# Patient Record
Sex: FEMALE | Race: WHITE | HISPANIC OR LATINO | Employment: FULL TIME | URBAN - METROPOLITAN AREA
[De-identification: names, ages, dates, MRNs, and addresses within clinical notes are randomized per-mention and may not be internally consistent; named-entity substitution may affect disease eponyms.]

---

## 2018-05-10 ENCOUNTER — TRANSCRIBE ORDERS (OUTPATIENT)
Dept: LAB | Facility: HOSPITAL | Age: 28
End: 2018-05-10

## 2018-05-10 ENCOUNTER — APPOINTMENT (OUTPATIENT)
Dept: LAB | Facility: HOSPITAL | Age: 28
End: 2018-05-10
Attending: INTERNAL MEDICINE
Payer: COMMERCIAL

## 2018-05-10 DIAGNOSIS — Z00.00 ROUTINE MEDICAL EXAM: ICD-10-CM

## 2018-05-10 DIAGNOSIS — E55.9 AVITAMINOSIS D: Primary | ICD-10-CM

## 2018-05-10 DIAGNOSIS — E55.9 AVITAMINOSIS D: ICD-10-CM

## 2018-05-10 LAB
25(OH)D3 SERPL-MCNC: 17.6 NG/ML (ref 30–100)
ALBUMIN SERPL BCP-MCNC: 3.4 G/DL (ref 3.5–5)
ALP SERPL-CCNC: 82 U/L (ref 46–116)
ALT SERPL W P-5'-P-CCNC: 29 U/L (ref 12–78)
ANION GAP SERPL CALCULATED.3IONS-SCNC: 5 MMOL/L (ref 4–13)
AST SERPL W P-5'-P-CCNC: 16 U/L (ref 5–45)
BASOPHILS # BLD AUTO: 0.03 THOUSANDS/ΜL (ref 0–0.1)
BASOPHILS NFR BLD AUTO: 0 % (ref 0–1)
BILIRUB SERPL-MCNC: 0.52 MG/DL (ref 0.2–1)
BUN SERPL-MCNC: 12 MG/DL (ref 5–25)
CALCIUM SERPL-MCNC: 8.9 MG/DL (ref 8.3–10.1)
CHLORIDE SERPL-SCNC: 105 MMOL/L (ref 100–108)
CHOLEST SERPL-MCNC: 144 MG/DL (ref 50–200)
CO2 SERPL-SCNC: 29 MMOL/L (ref 21–32)
CREAT SERPL-MCNC: 0.81 MG/DL (ref 0.6–1.3)
EOSINOPHIL # BLD AUTO: 0.65 THOUSAND/ΜL (ref 0–0.61)
EOSINOPHIL NFR BLD AUTO: 8 % (ref 0–6)
ERYTHROCYTE [DISTWIDTH] IN BLOOD BY AUTOMATED COUNT: 12.4 % (ref 11.6–15.1)
GFR SERPL CREATININE-BSD FRML MDRD: 101 ML/MIN/1.73SQ M
GLUCOSE P FAST SERPL-MCNC: 95 MG/DL (ref 65–99)
HCT VFR BLD AUTO: 40.3 % (ref 34.8–46.1)
HDLC SERPL-MCNC: 53 MG/DL (ref 40–60)
HGB BLD-MCNC: 13.3 G/DL (ref 11.5–15.4)
LDLC SERPL CALC-MCNC: 78 MG/DL (ref 0–100)
LYMPHOCYTES # BLD AUTO: 3.33 THOUSANDS/ΜL (ref 0.6–4.47)
LYMPHOCYTES NFR BLD AUTO: 43 % (ref 14–44)
MCH RBC QN AUTO: 30.1 PG (ref 26.8–34.3)
MCHC RBC AUTO-ENTMCNC: 33 G/DL (ref 31.4–37.4)
MCV RBC AUTO: 91 FL (ref 82–98)
MONOCYTES # BLD AUTO: 0.35 THOUSAND/ΜL (ref 0.17–1.22)
MONOCYTES NFR BLD AUTO: 5 % (ref 4–12)
NEUTROPHILS # BLD AUTO: 3.43 THOUSANDS/ΜL (ref 1.85–7.62)
NEUTS SEG NFR BLD AUTO: 44 % (ref 43–75)
NRBC BLD AUTO-RTO: 0 /100 WBCS
PLATELET # BLD AUTO: 252 THOUSANDS/UL (ref 149–390)
PMV BLD AUTO: 11.4 FL (ref 8.9–12.7)
POTASSIUM SERPL-SCNC: 3.4 MMOL/L (ref 3.5–5.3)
PROT SERPL-MCNC: 7 G/DL (ref 6.4–8.2)
RBC # BLD AUTO: 4.42 MILLION/UL (ref 3.81–5.12)
SODIUM SERPL-SCNC: 139 MMOL/L (ref 136–145)
T4 FREE SERPL-MCNC: 0.85 NG/DL (ref 0.76–1.46)
TRIGL SERPL-MCNC: 67 MG/DL
TSH SERPL DL<=0.05 MIU/L-ACNC: 2.72 UIU/ML (ref 0.36–3.74)
WBC # BLD AUTO: 7.82 THOUSAND/UL (ref 4.31–10.16)

## 2018-05-10 PROCEDURE — 80053 COMPREHEN METABOLIC PANEL: CPT

## 2018-05-10 PROCEDURE — 80061 LIPID PANEL: CPT

## 2018-05-10 PROCEDURE — 82306 VITAMIN D 25 HYDROXY: CPT

## 2018-05-10 PROCEDURE — 84443 ASSAY THYROID STIM HORMONE: CPT

## 2018-05-10 PROCEDURE — 36415 COLL VENOUS BLD VENIPUNCTURE: CPT

## 2018-05-10 PROCEDURE — 85025 COMPLETE CBC W/AUTO DIFF WBC: CPT

## 2018-05-10 PROCEDURE — 84439 ASSAY OF FREE THYROXINE: CPT

## 2019-05-30 ENCOUNTER — HOSPITAL ENCOUNTER (OUTPATIENT)
Dept: RADIOLOGY | Facility: HOSPITAL | Age: 29
Discharge: HOME | End: 2019-05-30
Attending: INTERNAL MEDICINE
Payer: COMMERCIAL

## 2019-05-30 ENCOUNTER — TRANSCRIBE ORDERS (OUTPATIENT)
Dept: RADIOLOGY | Facility: HOSPITAL | Age: 29
End: 2019-05-30

## 2019-05-30 DIAGNOSIS — M25.562 LEFT KNEE PAIN, UNSPECIFIED CHRONICITY: Primary | ICD-10-CM

## 2019-05-30 DIAGNOSIS — M25.562 LEFT KNEE PAIN, UNSPECIFIED CHRONICITY: ICD-10-CM

## 2019-05-30 PROCEDURE — 73564 X-RAY EXAM KNEE 4 OR MORE: CPT

## 2022-03-17 ENCOUNTER — OFFICE VISIT (OUTPATIENT)
Dept: OBGYN CLINIC | Facility: CLINIC | Age: 32
End: 2022-03-17

## 2022-03-17 VITALS — WEIGHT: 248 LBS | BODY MASS INDEX: 38.92 KG/M2 | HEIGHT: 67 IN

## 2022-03-17 DIAGNOSIS — B37.3 VAGINAL YEAST INFECTION: Primary | ICD-10-CM

## 2022-03-17 DIAGNOSIS — N76.0 BACTERIAL VAGINOSIS: ICD-10-CM

## 2022-03-17 DIAGNOSIS — B96.89 BACTERIAL VAGINOSIS: ICD-10-CM

## 2022-03-17 PROCEDURE — 99203 OFFICE O/P NEW LOW 30 MIN: CPT | Performed by: OBSTETRICS & GYNECOLOGY

## 2022-03-17 RX ORDER — PANTOPRAZOLE SODIUM 40 MG/1
40 TABLET, DELAYED RELEASE ORAL DAILY
COMMUNITY
Start: 2022-02-28 | End: 2022-08-27

## 2022-03-17 RX ORDER — FLUCONAZOLE 150 MG/1
150 TABLET ORAL ONCE
Qty: 1 TABLET | Refills: 0 | Status: SHIPPED | OUTPATIENT
Start: 2022-03-17 | End: 2022-03-17

## 2022-03-17 RX ORDER — APIXABAN 2.5 MG/1
2.5 TABLET, FILM COATED ORAL 2 TIMES DAILY
COMMUNITY
Start: 2022-02-28

## 2022-03-17 RX ORDER — METRONIDAZOLE 500 MG/1
500 TABLET ORAL EVERY 12 HOURS SCHEDULED
Qty: 14 TABLET | Refills: 0 | Status: SHIPPED | OUTPATIENT
Start: 2022-03-17 | End: 2022-03-24

## 2022-03-17 RX ORDER — PANTOPRAZOLE SODIUM 40 MG/1
40 TABLET, DELAYED RELEASE ORAL DAILY
COMMUNITY
Start: 2022-02-28

## 2022-03-17 RX ORDER — CETIRIZINE HYDROCHLORIDE 10 MG/1
10 TABLET ORAL
COMMUNITY

## 2022-03-17 RX ORDER — B-COMPLEX WITH VITAMIN C
1 TABLET ORAL 3 TIMES DAILY
COMMUNITY

## 2023-03-25 NOTE — PATIENT INSTRUCTIONS
Thank you for choosing us for your  care today  If you have any questions about your ultrasound or care, please do not hesitate to contact us or your primary obstetrician  Some general instructions for your pregnancy are:    Exercise: Aim for 22 minutes per day (150 minutes per week) of regular exercise  Walking is great! Nutrition: Choose healthy sources of calcium, iron, and protein  Learn about Preeclampsia: preeclampsia is a common, serious high blood pressure complication in pregnancy  A blood pressure of 328HGDS (systolic or top number) or 22NZDN (diastolic or bottom number) is not normal and needs evaluation by your doctor  Aspirin is sometimes prescribed in early pregnancy to prevent preeclampsia in women with risk factors - ask your obstetrician if you should be on this medication  For more resources, visit:  MapCoverage fi  If you smoke, try to reduce how many cigarettes you smoke or try to quit completely  Do not vape  Other warning signs to watch out for in pregnancy or postpartum: chest pain, obstructed breathing or shortness of breath, seizures, thoughts of hurting yourself or your baby, bleeding, a painful or swollen leg, fever, or headache (see AWHON[facility] POST-BIRTH Warning Signs campaign)  If these happen call 911  Itching is also not normal in pregnancy and if you experience this, especially over your hands and feet, potentially worse at night, notify your doctors

## 2023-03-30 ENCOUNTER — ROUTINE PRENATAL (OUTPATIENT)
Dept: PERINATAL CARE | Facility: CLINIC | Age: 33
End: 2023-03-30

## 2023-03-30 VITALS
HEIGHT: 67 IN | BODY MASS INDEX: 29.35 KG/M2 | HEART RATE: 58 BPM | DIASTOLIC BLOOD PRESSURE: 56 MMHG | WEIGHT: 187 LBS | SYSTOLIC BLOOD PRESSURE: 99 MMHG

## 2023-03-30 DIAGNOSIS — Z82.49 FAMILY HISTORY OF THROMBOEMBOLIC DISEASE: ICD-10-CM

## 2023-03-30 DIAGNOSIS — O09.899 SUPERVISION OF OTHER HIGH RISK PREGNANCIES, UNSPECIFIED TRIMESTER: ICD-10-CM

## 2023-03-30 DIAGNOSIS — O99.840 PREVIOUS BARIATRIC SURGERY AFFECTING PREGNANCY, ANTEPARTUM: ICD-10-CM

## 2023-03-30 DIAGNOSIS — Z36.3 ENCOUNTER FOR ANTENATAL SCREENING FOR MALFORMATIONS: Primary | ICD-10-CM

## 2023-03-30 DIAGNOSIS — Z36.86 ENCOUNTER FOR ANTENATAL SCREENING FOR CERVICAL LENGTH: ICD-10-CM

## 2023-03-30 NOTE — PROGRESS NOTES
Ultrasound Probe Disinfection    A transvaginal ultrasound was performed  Prior to use, disinfection was performed with High Level Disinfection Process (Trophon)  Probe serial number [id number]: [id number] was used        Tita Dahl  03/30/23  8:34 AM

## 2023-03-30 NOTE — PROGRESS NOTES
"[address]: Ms Federico Riley was seen today for anatomic survey and cervical length screening ultrasound  See ultrasound report under \"OB Procedures\" tab  Review of Systems   Constitutional: Negative for chills, fever and unexpected weight change  HENT: Positive for congestion  Negative for dental problem, facial swelling and sore throat  Eyes: Negative for visual disturbance  Respiratory: Negative for cough and shortness of breath  Cardiovascular: Negative for chest pain and palpitations  Gastrointestinal: Negative for diarrhea and vomiting  Endocrine: Negative for polydipsia  Thirst   Genitourinary: Negative for dysuria and vaginal bleeding  Musculoskeletal: Negative for back pain and joint swelling  Skin: Negative for rash and wound  Allergic/Immunologic: Negative for immunocompromised state  Neurological: Negative for seizures and headaches  Hematological: Does not bruise/bleed easily  Psychiatric/Behavioral: Negative for hallucinations and suicidal ideas  Physical Exam  Constitutional:       General: She is not in acute distress  Appearance: Normal appearance  She is not ill-appearing, toxic-appearing or diaphoretic  HENT:      Head: Normocephalic and atraumatic  Nose: No congestion or rhinorrhea  Eyes:      General: No scleral icterus  Right eye: No discharge  Left eye: No discharge  Extraocular Movements: Extraocular movements intact  Conjunctiva/sclera: Conjunctivae normal    Pulmonary:      Effort: Pulmonary effort is normal  No respiratory distress  Musculoskeletal:      Cervical back: Normal range of motion  Skin:     Coloration: Skin is not jaundiced or pale  Findings: No erythema, lesion or rash  Neurological:      General: No focal deficit present  Mental Status: She is alert and oriented to person, place, and time     Psychiatric:         Mood and Affect: Mood normal          " Behavior: Behavior normal          Please don't hesitate to contact our office with any concerns or questions    Rosa Polanco MD

## 2023-03-30 NOTE — LETTER
3/30/2023    Julisa Rain DO  [address]    Patient: Saranya Romero   YOB: 1990   Date of Visit: 3/30/2023   Keyla Drain of this communication: Routine       Dear [facility],    This patient was seen recently in our  office  Consultation is contained in body of ultrasound report which has been faxed to you under separate cover; please contact us if you do not receive this  Please don't hesitate to contact our office with any concerns or questions       Sincerely,      Marilynn Brothers MD  Attending Physician, Nikolay

## 2023-06-15 ENCOUNTER — ULTRASOUND (OUTPATIENT)
Dept: PERINATAL CARE | Facility: CLINIC | Age: 33
End: 2023-06-15
Payer: COMMERCIAL

## 2023-06-15 VITALS
DIASTOLIC BLOOD PRESSURE: 64 MMHG | BODY MASS INDEX: 32.15 KG/M2 | HEIGHT: 67 IN | WEIGHT: 204.8 LBS | SYSTOLIC BLOOD PRESSURE: 122 MMHG

## 2023-06-15 DIAGNOSIS — Z82.49 FAMILY HISTORY OF THROMBOEMBOLIC DISEASE: ICD-10-CM

## 2023-06-15 DIAGNOSIS — Z3A.32 32 WEEKS GESTATION OF PREGNANCY: Primary | ICD-10-CM

## 2023-06-15 PROCEDURE — 76816 OB US FOLLOW-UP PER FETUS: CPT | Performed by: OBSTETRICS & GYNECOLOGY

## 2023-06-15 NOTE — PROGRESS NOTES
A fetal ultrasound was completed  See Ob procedures in Epic for an interpretation and recommendations  Do not hesitate to contact us in [facility] if you have questions  Amaury Bills MD, [facility]  Maternal Fetal Medicine 4

## 2023-06-15 NOTE — LETTER
Devika 15, 2023     Gladys Alvarado 60    Patient: Juni Tam   YOB: 1990   Date of Visit: 6/15/2023       Dear Dr Brice Banks: Thank you for referring Juni Tam to me for evaluation  Below are my notes for this consultation  If you have questions, please do not hesitate to call me  I look forward to following your patient along with you  Sincerely,        Izabel Willingham MD        CC: No Recipients    Izabel Willingham MD  6/15/2023 10:32 AM  Sign when Signing Visit  A fetal ultrasound was completed  See Ob procedures in Epic for an interpretation and recommendations  Do not hesitate to contact us in [facility] if you have questions  Cam Pugh MD, [facility]  Maternal Fetal Medicine

## 2023-07-14 ENCOUNTER — ULTRASOUND (OUTPATIENT)
Dept: PERINATAL CARE | Facility: CLINIC | Age: 33
End: 2023-07-14
Payer: COMMERCIAL

## 2023-07-14 VITALS
WEIGHT: 202.6 LBS | BODY MASS INDEX: 31.8 KG/M2 | DIASTOLIC BLOOD PRESSURE: 70 MMHG | HEIGHT: 67 IN | HEART RATE: 80 BPM | SYSTOLIC BLOOD PRESSURE: 110 MMHG

## 2023-07-14 DIAGNOSIS — Z36.89 ENCOUNTER FOR ULTRASOUND TO ASSESS FETAL GROWTH: ICD-10-CM

## 2023-07-14 DIAGNOSIS — O36.5990 FETAL GROWTH RESTRICTION ANTEPARTUM: ICD-10-CM

## 2023-07-14 DIAGNOSIS — Z3A.32 32 WEEKS GESTATION OF PREGNANCY: Primary | ICD-10-CM

## 2023-07-14 PROCEDURE — 59025 FETAL NON-STRESS TEST: CPT | Performed by: OBSTETRICS & GYNECOLOGY

## 2023-07-14 PROCEDURE — 76820 UMBILICAL ARTERY ECHO: CPT | Performed by: OBSTETRICS & GYNECOLOGY

## 2023-07-14 PROCEDURE — 76816 OB US FOLLOW-UP PER FETUS: CPT | Performed by: OBSTETRICS & GYNECOLOGY

## 2023-07-14 NOTE — LETTER
July 14, 2023     Bev Roth, [facility]    Patient: Boogie Carter   YOB: 1990   Date of Visit: 7/14/2023       Dear. Dr. Vilma Abernathy was seen today for an ultrasound, which will be separately forwarded via Fax. Please contact our office if you do not receive the report, or with any questions/concerns.     Sincerely,  Rica Hannon MD        CC: No Recipients

## 2023-07-14 NOTE — PROGRESS NOTES
Non-Stress Testing:    Non-Stress test, equipment, procedure, and expected outcome reviewed. Reviewed fetal kick counts and when to call OB. Verified patient understanding of fetal kick counts with teach back method. Patient reports feeling daily fetal movements. Patient has no questions or concerns.

## 2023-07-17 PROCEDURE — 87150 DNA/RNA AMPLIFIED PROBE: CPT | Performed by: OBSTETRICS & GYNECOLOGY

## 2023-07-18 ENCOUNTER — LAB REQUISITION (OUTPATIENT)
Dept: LAB | Facility: HOSPITAL | Age: 33
End: 2023-07-18
Payer: COMMERCIAL

## 2023-07-18 DIAGNOSIS — Z36.85 ENCOUNTER FOR ANTENATAL SCREENING FOR STREPTOCOCCUS B: ICD-10-CM

## 2023-07-19 ENCOUNTER — ULTRASOUND (OUTPATIENT)
Dept: PERINATAL CARE | Facility: CLINIC | Age: 33
End: 2023-07-19
Payer: COMMERCIAL

## 2023-07-19 VITALS
WEIGHT: 209 LBS | HEIGHT: 67 IN | HEART RATE: 95 BPM | DIASTOLIC BLOOD PRESSURE: 60 MMHG | BODY MASS INDEX: 32.8 KG/M2 | SYSTOLIC BLOOD PRESSURE: 120 MMHG

## 2023-07-19 DIAGNOSIS — Z3A.37 37 WEEKS GESTATION OF PREGNANCY: ICD-10-CM

## 2023-07-19 DIAGNOSIS — O36.5990 FETAL GROWTH RESTRICTION ANTEPARTUM: Primary | ICD-10-CM

## 2023-07-19 LAB — GP B STREP DNA SPEC QL NAA+PROBE: NEGATIVE

## 2023-07-19 PROCEDURE — 76820 UMBILICAL ARTERY ECHO: CPT | Performed by: STUDENT IN AN ORGANIZED HEALTH CARE EDUCATION/TRAINING PROGRAM

## 2023-07-19 PROCEDURE — 76815 OB US LIMITED FETUS(S): CPT | Performed by: STUDENT IN AN ORGANIZED HEALTH CARE EDUCATION/TRAINING PROGRAM

## 2023-07-19 PROCEDURE — 59025 FETAL NON-STRESS TEST: CPT | Performed by: STUDENT IN AN ORGANIZED HEALTH CARE EDUCATION/TRAINING PROGRAM

## 2023-07-19 NOTE — PROGRESS NOTES
[id number]  [facility] [place]: Ms. Janak Waters was seen today for umbilical artery Doppler study, KATIE, and NST. See ultrasound report under "OB Procedures" tab. Please don't hesitate to contact our office with any concerns or questions.   -Pamela Costello MD

## 2023-07-19 NOTE — LETTER
July 19, 2023     Marbella [employer]nerg, [address]    Patient: Parul Monroy   YOB: 1990   Date of Visit: 7/19/2023       Dear Dr. Esa Amezcua: Thank you for referring Parul Monroy to me for evaluation. Below are my notes for this consultation. If you have questions, please do not hesitate to call me. I look forward to following your patient along with you. Sincerely,        Floyd Ward MD        CC: No Recipients    Floyd Ward MD  7/19/2023 11:48 AM  Sign when Signing Visit  [id number]  [facility] [place]: Ms. Myah Callaway was seen today for umbilical artery Doppler study, KATIE, and NST. See ultrasound report under "OB Procedures" tab. Please don't hesitate to contact our office with any concerns or questions.   -Floyd Ward MD

## 2023-07-26 ENCOUNTER — HOSPITAL ENCOUNTER (INPATIENT)
Facility: HOSPITAL | Age: 33
LOS: 2 days | Discharge: HOME | End: 2023-07-29
Attending: OBSTETRICS & GYNECOLOGY | Admitting: OBSTETRICS & GYNECOLOGY
Payer: COMMERCIAL

## 2023-07-26 DIAGNOSIS — Z3A.38 38 WEEKS GESTATION OF PREGNANCY: Primary | ICD-10-CM

## 2023-07-27 ENCOUNTER — ANESTHESIA (INPATIENT)
Dept: ANESTHESIOLOGY | Facility: HOSPITAL | Age: 33
End: 2023-07-27
Payer: COMMERCIAL

## 2023-07-27 ENCOUNTER — ANESTHESIA EVENT (INPATIENT)
Dept: ANESTHESIOLOGY | Facility: HOSPITAL | Age: 33
End: 2023-07-27
Payer: COMMERCIAL

## 2023-07-27 PROBLEM — O99.843 BARIATRIC SURGERY STATUS COMPLICATING PREGNANCY, THIRD TRIMESTER: Status: ACTIVE | Noted: 2023-07-27

## 2023-07-27 PROBLEM — J45.909 ASTHMA: Status: ACTIVE | Noted: 2023-07-27

## 2023-07-27 PROBLEM — Z34.90 ENCOUNTER FOR INDUCTION OF LABOR: Status: ACTIVE | Noted: 2023-07-27

## 2023-07-27 PROBLEM — Z98.84 BARIATRIC SURGERY STATUS: Status: ACTIVE | Noted: 2023-07-27

## 2023-07-27 PROBLEM — Z98.84 BARIATRIC SURGERY STATUS: Status: RESOLVED | Noted: 2023-07-27 | Resolved: 2023-07-27

## 2023-07-27 PROBLEM — O42.90 LEAKAGE OF AMNIOTIC FLUID: Status: ACTIVE | Noted: 2023-07-27

## 2023-07-27 LAB
ABO GROUP BLD: NORMAL
ABO GROUP BLD: NORMAL
BASE EXCESS BLDCOA CALC-SCNC: -3.9 MMOL/L (ref 3–11)
BASE EXCESS BLDCOV CALC-SCNC: -0.9 MMOL/L (ref 1–9)
BLD GP AB SCN SERPL QL: NEGATIVE
ERYTHROCYTE [DISTWIDTH] IN BLOOD BY AUTOMATED COUNT: 12.9 % (ref 11.6–15.1)
HCO3 BLDCOA-SCNC: 22.4 MMOL/L (ref 17.3–27.3)
HCO3 BLDCOV-SCNC: 24.5 MMOL/L (ref 12.2–28.6)
HCT VFR BLD AUTO: 33.4 % (ref 34.8–46.1)
HGB BLD-MCNC: 10.7 G/DL (ref 11.5–15.4)
HOLD SPECIMEN: NORMAL
MCH RBC QN AUTO: 29.3 PG (ref 26.8–34.3)
MCHC RBC AUTO-ENTMCNC: 32 G/DL (ref 31.4–37.4)
MCV RBC AUTO: 92 FL (ref 82–98)
O2 CT VFR BLDCOA CALC: 7.2 ML/DL
OXYHGB MFR BLDCOA: 32 %
OXYHGB MFR BLDCOV: 55.9 %
PCO2 BLDCOA: 45.5 MM[HG] (ref 30–60)
PCO2 BLDCOV: 43.2 MM HG (ref 27–43)
PH BLDCOA: 7.31 [PH] (ref 7.23–7.43)
PH BLDCOV: 7.37 [PH] (ref 7.19–7.49)
PLATELET # BLD AUTO: 249 THOUSANDS/UL (ref 149–390)
PMV BLD AUTO: 11.8 FL (ref 8.9–12.7)
PO2 BLDCOA: 16 MM HG (ref 5–25)
PO2 BLDCOV: 22.5 MM HG (ref 15–45)
RBC # BLD AUTO: 3.65 MILLION/UL (ref 3.81–5.12)
RH BLD: POSITIVE
RH BLD: POSITIVE
SAO2 % BLDCOV: 12.7 ML/DL
SPECIMEN EXPIRATION DATE: NORMAL
TREPONEMA PALLIDUM IGG+IGM AB [PRESENCE] IN SERUM OR PLASMA BY IMMUNOASSAY: NORMAL
WBC # BLD AUTO: 8.63 THOUSAND/UL (ref 4.31–10.16)

## 2023-07-27 PROCEDURE — 86850 RBC ANTIBODY SCREEN: CPT

## 2023-07-27 PROCEDURE — 99202 OFFICE O/P NEW SF 15 MIN: CPT

## 2023-07-27 PROCEDURE — 86780 TREPONEMA PALLIDUM: CPT

## 2023-07-27 PROCEDURE — NC001 PR NO CHARGE: Performed by: OBSTETRICS & GYNECOLOGY

## 2023-07-27 PROCEDURE — 86900 BLOOD TYPING SEROLOGIC ABO: CPT

## 2023-07-27 PROCEDURE — 4A1HXCZ MONITORING OF PRODUCTS OF CONCEPTION, CARDIAC RATE, EXTERNAL APPROACH: ICD-10-PCS | Performed by: OBSTETRICS & GYNECOLOGY

## 2023-07-27 PROCEDURE — 85027 COMPLETE CBC AUTOMATED: CPT

## 2023-07-27 PROCEDURE — 86901 BLOOD TYPING SEROLOGIC RH(D): CPT

## 2023-07-27 PROCEDURE — 82805 BLOOD GASES W/O2 SATURATION: CPT | Performed by: OBSTETRICS & GYNECOLOGY

## 2023-07-27 PROCEDURE — 88307 TISSUE EXAM BY PATHOLOGIST: CPT | Performed by: PATHOLOGY

## 2023-07-27 PROCEDURE — 10H073Z INSERTION OF MONITORING ELECTRODE INTO PRODUCTS OF CONCEPTION, VIA NATURAL OR ARTIFICIAL OPENING: ICD-10-PCS | Performed by: OBSTETRICS & GYNECOLOGY

## 2023-07-27 PROCEDURE — 4A1H7CZ MONITORING OF PRODUCTS OF CONCEPTION, CARDIAC RATE, VIA NATURAL OR ARTIFICIAL OPENING: ICD-10-PCS | Performed by: OBSTETRICS & GYNECOLOGY

## 2023-07-27 RX ORDER — OXYTOCIN/RINGER'S LACTATE 30/500 ML
250 PLASTIC BAG, INJECTION (ML) INTRAVENOUS ONCE
Status: DISCONTINUED | OUTPATIENT
Start: 2023-07-27 | End: 2023-07-29 | Stop reason: HOSPADM

## 2023-07-27 RX ORDER — BUPIVACAINE HYDROCHLORIDE 2.5 MG/ML
30 INJECTION, SOLUTION EPIDURAL; INFILTRATION; INTRACAUDAL ONCE AS NEEDED
Status: DISCONTINUED | OUTPATIENT
Start: 2023-07-27 | End: 2023-07-27

## 2023-07-27 RX ORDER — DIPHENHYDRAMINE HCL 25 MG
25 TABLET ORAL EVERY 6 HOURS PRN
Status: DISCONTINUED | OUTPATIENT
Start: 2023-07-27 | End: 2023-07-29 | Stop reason: HOSPADM

## 2023-07-27 RX ORDER — ONDANSETRON 2 MG/ML
4 INJECTION INTRAMUSCULAR; INTRAVENOUS EVERY 4 HOURS PRN
Status: DISCONTINUED | OUTPATIENT
Start: 2023-07-27 | End: 2023-07-27

## 2023-07-27 RX ORDER — SODIUM CHLORIDE, SODIUM LACTATE, POTASSIUM CHLORIDE, CALCIUM CHLORIDE 600; 310; 30; 20 MG/100ML; MG/100ML; MG/100ML; MG/100ML
125 INJECTION, SOLUTION INTRAVENOUS CONTINUOUS
Status: DISCONTINUED | OUTPATIENT
Start: 2023-07-27 | End: 2023-07-27

## 2023-07-27 RX ORDER — IBUPROFEN 600 MG/1
600 TABLET ORAL EVERY 6 HOURS
Status: DISCONTINUED | OUTPATIENT
Start: 2023-07-27 | End: 2023-07-29 | Stop reason: HOSPADM

## 2023-07-27 RX ORDER — LIDOCAINE HYDROCHLORIDE AND EPINEPHRINE 15; 5 MG/ML; UG/ML
INJECTION, SOLUTION EPIDURAL AS NEEDED
Status: DISCONTINUED | OUTPATIENT
Start: 2023-07-27 | End: 2023-07-27 | Stop reason: HOSPADM

## 2023-07-27 RX ORDER — ROPIVACAINE HYDROCHLORIDE 2 MG/ML
INJECTION, SOLUTION EPIDURAL; INFILTRATION; PERINEURAL AS NEEDED
Status: DISCONTINUED | OUTPATIENT
Start: 2023-07-27 | End: 2023-07-27 | Stop reason: HOSPADM

## 2023-07-27 RX ORDER — DOCUSATE SODIUM 100 MG/1
100 CAPSULE, LIQUID FILLED ORAL 2 TIMES DAILY
Status: DISCONTINUED | OUTPATIENT
Start: 2023-07-27 | End: 2023-07-29 | Stop reason: HOSPADM

## 2023-07-27 RX ORDER — ONDANSETRON 2 MG/ML
4 INJECTION INTRAMUSCULAR; INTRAVENOUS EVERY 8 HOURS PRN
Status: DISCONTINUED | OUTPATIENT
Start: 2023-07-27 | End: 2023-07-29 | Stop reason: HOSPADM

## 2023-07-27 RX ORDER — DIAPER,BRIEF,INFANT-TODD,DISP
1 EACH MISCELLANEOUS DAILY PRN
Status: DISCONTINUED | OUTPATIENT
Start: 2023-07-27 | End: 2023-07-29 | Stop reason: HOSPADM

## 2023-07-27 RX ORDER — OXYTOCIN/RINGER'S LACTATE 30/500 ML
1-30 PLASTIC BAG, INJECTION (ML) INTRAVENOUS
Status: DISCONTINUED | OUTPATIENT
Start: 2023-07-27 | End: 2023-07-27

## 2023-07-27 RX ORDER — CALCIUM CARBONATE 500 MG/1
1000 TABLET, CHEWABLE ORAL DAILY PRN
Status: DISCONTINUED | OUTPATIENT
Start: 2023-07-27 | End: 2023-07-29 | Stop reason: HOSPADM

## 2023-07-27 RX ORDER — ACETAMINOPHEN 325 MG/1
650 TABLET ORAL EVERY 4 HOURS PRN
Status: DISCONTINUED | OUTPATIENT
Start: 2023-07-27 | End: 2023-07-29 | Stop reason: HOSPADM

## 2023-07-27 RX ADMIN — SODIUM CHLORIDE, SODIUM LACTATE, POTASSIUM CHLORIDE, AND CALCIUM CHLORIDE 125 ML/HR: .6; .31; .03; .02 INJECTION, SOLUTION INTRAVENOUS at 03:25

## 2023-07-27 RX ADMIN — Medication 250 MILLI-UNITS/MIN: at 06:42

## 2023-07-27 RX ADMIN — BENZOCAINE AND LEVOMENTHOL 1 APPLICATION: 200; 5 SPRAY TOPICAL at 09:35

## 2023-07-27 RX ADMIN — SODIUM CHLORIDE, SODIUM LACTATE, POTASSIUM CHLORIDE, AND CALCIUM CHLORIDE 999 ML/HR: .6; .31; .03; .02 INJECTION, SOLUTION INTRAVENOUS at 01:15

## 2023-07-27 RX ADMIN — WITCH HAZEL 1 PAD: 500 SOLUTION RECTAL; TOPICAL at 09:35

## 2023-07-27 RX ADMIN — ROPIVACAINE HYDROCHLORIDE 2 ML: 2 INJECTION EPIDURAL; INFILTRATION; PERINEURAL at 02:43

## 2023-07-27 RX ADMIN — SODIUM CHLORIDE, SODIUM LACTATE, POTASSIUM CHLORIDE, AND CALCIUM CHLORIDE 999 ML/HR: .6; .31; .03; .02 INJECTION, SOLUTION INTRAVENOUS at 02:15

## 2023-07-27 RX ADMIN — LIDOCAINE HYDROCHLORIDE AND EPINEPHRINE 3 ML: 15; 5 INJECTION, SOLUTION EPIDURAL at 02:35

## 2023-07-27 RX ADMIN — DOCUSATE SODIUM 100 MG: 100 CAPSULE, LIQUID FILLED ORAL at 18:30

## 2023-07-27 RX ADMIN — DOCUSATE SODIUM 100 MG: 100 CAPSULE, LIQUID FILLED ORAL at 09:36

## 2023-07-27 RX ADMIN — ROPIVACAINE HYDROCHLORIDE 10 ML/HR: 2 INJECTION EPIDURAL; INFILTRATION; PERINEURAL at 02:44

## 2023-07-27 RX ADMIN — ROPIVACAINE HYDROCHLORIDE 5 ML: 2 INJECTION EPIDURAL; INFILTRATION; PERINEURAL at 02:38

## 2023-07-27 NOTE — H&P
[address] 33 y.o. female MRN: [id number]  Unit/Bed#: L&D 324-01 Encounter: [id number]    Assessment: 33 y.o.  at 38w2d admitted for PROM vs SROM. SVE: /-2  FHT: Cat I  Clinical EFW: 6 ; Cephalic confirmed by ultrasound  GBS status: neg   Postpartum contraception: declines    Plan:   * Leakage of amniotic fluid  Assessment & Plan  Admit for SROM vs PROM  T&S, CBC, RPR  CLD  IV fluids  GBS prophylaxis is not needed  Expectant management, induce with pitocin if necessary      Bariatric surgery status complicating pregnancy, third trimester  Assessment & Plan  Patient reports in    Avoid NSAIDs    Fetal growth restriction antepartum  Assessment & Plan     AC             30.51 cm        34 weeks 4 days* (12%)  BPD             8.63 cm        34 weeks 6 days* (18%)  HC             32.36 cm        36 weeks 4 days* (25%)  Femur           6.22 cm        32 weeks 1 day * (<1%)     Humerus          5.3 cm        30 weeks 5 days  (<5%)     HC/AC           1.06 [0.93 - 1.11]                 (75%)  FL/AC             20 [20 - 24]  FL/BPD            72 [71 - 87]  EFW Hadlock 4   2348 grams - 5 lbs 3 oz                 (8%)     Nml dopplers        Discussed case and plan w/ Dr. Shanice Quesada      Chief Complaint: leaking fluid    HPI: Casey Palacios is a 33 y.o. Walte Marisol with an EMMANUELLE of 2023, by Last Menstrual Period at 38w2d who is being admitted for premature ruptue of membranes. She denies having uterine contractions, has moderate LOF, and reports no VB. She states she has felt good FM. Patient Active Problem List   Diagnosis   • Family history of thromboembolic disease   • 32 weeks gestation of pregnancy   • Fetal growth restriction antepartum   • Leakage of amniotic fluid   • Asthma   • Bariatric surgery status complicating pregnancy, third trimester       Baby complications/comments: IUGR    Review of Systems   Constitutional: Negative for chills and fever. Respiratory: Negative for cough and shortness of breath. Cardiovascular: Negative for chest pain and leg swelling. Gastrointestinal: Negative for abdominal pain, nausea and vomiting. Genitourinary: Negative for dysuria, pelvic pain, urgency, vaginal bleeding and vaginal discharge. Neurological: Negative for dizziness, light-headedness and headaches. All other systems reviewed and are negative. OB Hx:  OB History    Para Term  AB Living   2 1 1 0 0 1   SAB IAB Ectopic Multiple Live Births                  # Outcome Date GA Lbr Lior/2nd Weight Sex Delivery Anes PTL Lv   2 Current            1 Term                Past Medical Hx:  Past Medical History:   Diagnosis Date   • Asthma    • Narcolepsy    • PCOS (polycystic ovarian syndrome)    • Sleep apnea        Past Surgical hx:  Past Surgical History:   Procedure Laterality Date   • GASTRIC BYPASS      23       Social Hx:  Alcohol use: no  Tobacco use: no  Other substance use: no    No Known Allergies    Medications Prior to Admission   Medication   • cetirizine (ZyrTEC) 10 mg tablet   • Prenatal MV-Min-Fe Fum-FA-DHA (PRENATAL 1 PO)       Objective:  Temp:  [97.5 °F (36.4 °C)] 97.5 °F (36.4 °C)  HR:  [61-96] 73  Resp:  [18] 18  BP: (105-124)/(53-80) 105/58  Body mass index is 33.23 kg/m². Physical Exam:  Physical Exam  Constitutional:       Appearance: Normal appearance. Cardiovascular:      Rate and Rhythm: Normal rate and regular rhythm. Heart sounds: No murmur heard. No friction rub. No gallop. Pulmonary:      Effort: Pulmonary effort is normal. No respiratory distress. Breath sounds: No wheezing. Abdominal:      Palpations: Abdomen is soft. Tenderness: There is no abdominal tenderness. Musculoskeletal:         General: No swelling or tenderness. Neurological:      Mental Status: She is alert and oriented to person, place, and time. Skin:     General: Skin is warm and dry.    Psychiatric: Mood and Affect: Mood normal.   Vitals reviewed. FHT:  Baseline Rate: 120 bpm  Variability: Moderate 6-25 bpm  Accelerations: 15 x 15 or greater  Decelerations: None  FHR Category: Category I    TOCO:   Contraction Frequency (minutes): 4-5  Contraction Duration (seconds): 50-80  Contraction Quality: Moderate    Lab Results   Component Value Date    WBC 8.63 07/27/2023    HGB 10.7 (L) 07/27/2023    HCT 33.4 (L) 07/27/2023     07/27/2023     Lab Results   Component Value Date    K 3.4 (L) 05/10/2018     05/10/2018    CO2 29 05/10/2018    BUN 12 05/10/2018    CREATININE 0.81 05/10/2018    AST 16 05/10/2018    ALT 29 05/10/2018     Prenatal Labs: Reviewed      Blood type: O Pos  Antibody: Neg  GBS: Neg  HIV: NR  Rubella: Immune  Syphilis IgM/IgG: Neg  HBsAg: Neg  HCAb: Neg  Chlamydia: Neg  Gonorrhea: Neg  Diabetes 1 hour screen: normal  Platelets: pending  Hgb: pending  >2 Midnights  INPATIENT     Signature/Title:  Tressa Solorzano MD  Date: 7/27/2023  Time: 4:17 AM

## 2023-07-27 NOTE — LACTATION NOTE
This note was copied from a baby's chart. CONSULT - LACTATION  Baby Boy Tamiko Hinson 0 days male MRN: [id number]    [address] [place] [address] NURSERY Room / Bed: L&D 307(N)/L&D 307(N) Encounter: [id number]    Maternal Information     MOTHER:  Luisa William  Maternal Age: 33 y.o.   OB History: # 1 - Date: None, Sex: None, Weight: None, GA: None, Delivery: None, Apgar1: None, Apgar5: None, Living: None, Birth Comments: None    # 2 - Date: 23, Sex: Male, Weight: 2530 g (5 lb 9.2 oz), GA: 38w2d, Delivery: Vaginal, Spontaneous, Apgar1: 8, Apgar5: 9, Living: Living, Birth Comments: None   Previouse breast reduction surgery? No    Lactation history:   Has patient previously breast fed: No   How long had patient previously breast fed:     Previous breast feeding complications:       Past Surgical History:   Procedure Laterality Date   • GASTRIC BYPASS      22        Birth information:  YOB: 2022   Time of birth: 6:41 AM   Sex: male   Delivery type: Vaginal, Spontaneous   Birth Weight: 2530 g (5 lb 9.2 oz)   Percent of Weight Change: 0%     Gestational Age: 43w1d   [unfilled]    Assessment     Breast and nipple assessment: inverted nipple     Assessment: normal assessment    Feeding assessment: feeding well  LATCH:  Latch: Grasps breast, tongue down, lips flanged, rhythmic sucking   Audible Swallowing: Spontaneous and intermittent (24 hours old)   Type of Nipple: Inverted   Comfort (Breast/Nipple): Soft/non-tender   Hold (Positioning): Partial assist, teach one side, mother does other, staff holds   [facility] Score: 7           23 1600   Lactation Consultation   Reason for Consult 10;20 min   Lactation Consultant Total Time 27   Maternal Information   Has mother  before?  No   LATCH Documentation   Latch 2   Audible Swallowing 2   Type of Nipple 0   Comfort (Breast/Nipple) 2   Hold (Positioning) 1   LATCH Score 7   Having latch problems? No   Position(s) Used Cross Cradle   Breasts/Nipples   Left Breast Soft   Right Breast Soft   Left Nipple Everted   Right Nipple Inverted  (Nipple bifurcated with 2 lobes. When compressed across diameter, nipple inverts. With teacup hold, it remains everted enough to assist with [place] latching)   Intervention Hand expression  (effective)   Breastfeeding Progress Not yet established;Breastfeeding well   Breast Pump   Pump 3  (Has Lansinoh)   Patient Follow-Up   Lactation Consult Status 2   Follow-Up Type Inpatient;Call as needed   Other OB Lactation Documentation    Additional Problem Noted [place] is SGA baby on glucometer protocol. Latching well. 2 low glucometer readings. No supplementation recorded. HE effective  (Reviewed RSB. D/C booklet at bedside.)     Feeding recommendations:  breast feed on demand     Information on hand expression given. Discussed benefits of knowing how to manually express breast including stimulating milk supply, softening nipple for latch and evacuating breast in the event of engorgement. Reviewed how to bring baby to the breast so that his lower lip and chin touch the breast with his nose just above the nipple to encourage a wider, more asymmetric latch. Met with mother. Provided mother with Ready, Set, Baby booklet which contained information on:  Hand expression with access to QR codes to review hand expression. Positioning and latch reviewed as well as showing images of other feeding positions. Discussed the properties of a good latch in any position. Feeding on cue and what that means for recognizing infant's hunger, s/s that baby is getting enough milk and some s/s that breastfeeding dyad may need further help  Skin to Skin contact an benefits to mom and baby  Avoidance of pacifiers for the first month discussed. Gave information on common concerns, what to expect the first few weeks after delivery, preparing for other caregivers, and how partners can help. Resources for support also provided. Encouraged parents to call for assistance, questions, and concerns about breastfeeding. Extension provided.       Jeni Marques RN 7/27/2023 4:29 PM

## 2023-07-27 NOTE — OB LABOR/OXYTOCIN SAFETY PROGRESS
Labor Progress Note - Les Cordial 33 y.o. female MRN: [id number]    Unit/Bed#: L&D 324-01 Encounter: [id number]    Dose (markos-units/min) Oxytocin: 0 markos-units/min  Contraction Frequency (minutes): 1.5-5  Contraction Quality: Moderate      Cervical Dilation: 9        Cervical Effacement: 100  Fetal Station: 1  Baseline Rate: 120 bpm     FHR Category: Category I               Vital Signs:   Vitals:    07/27/23 0420   BP: 106/59   Pulse: 68   Resp:    Temp:    SpO2:        Notes/comments: Pt with intermittent variables, FSE placed for discontinuous tracing.          Gabe aMchado MD 7/27/2023 6:09 AM

## 2023-07-27 NOTE — ASSESSMENT & PLAN NOTE
AC             30.51 cm        34 weeks 4 days* (12%)  BPD             8.63 cm        34 weeks 6 days* (18%)  HC             32.36 cm        36 weeks 4 days* (25%)  Femur           6.22 cm        32 weeks 1 day * (<1%)     Humerus          5.3 cm        30 weeks 5 days  (<5%)     HC/AC           1.06 [0.93 - 1.11]                 (75%)  FL/AC             20 [20 - 24]  FL/BPD            72 [71 - 87]  EFW Hadlock 4   2348 grams - 5 lbs 3 oz                 (8%)     Nml dopplers

## 2023-07-27 NOTE — OB LABOR/OXYTOCIN SAFETY PROGRESS
Labor Progress Note - Reyes Villa 33 y.o. female MRN: [id number]    Unit/Bed#: L&D 324-01 Encounter: [id number]    Dose (markos-units/min) Oxytocin: 0 markos-units/min  Contraction Frequency (minutes): 1.5-5  Contraction Quality: Moderate      Cervical Dilation: 7        Cervical Effacement: 80  Fetal Station: 0  Baseline Rate: 120 bpm     FHR Category: Category I               Vital Signs:   Vitals:    07/27/23 0420   BP: 106/59   Pulse: 68   Resp:    Temp:    SpO2:        Notes/comments: Patient with intermittent variable decelerations, progressing appropriately.         Sruthi Reynolds MD 7/27/2023 5:45 AM

## 2023-07-27 NOTE — OB LABOR/OXYTOCIN SAFETY PROGRESS
Oxytocin Safety Progress Check Note - Boogie Carter 33 y.o. female MRN: [id number]    Unit/Bed#: L&D 324-01 Encounter: [id number]       Contraction Frequency (minutes): irregular  Contraction Quality: Mild      Cervical Dilation: 5        Cervical Effacement: 80  Fetal Station: -1  Baseline Rate: 120 bpm     FHR Category: Category I               Vital Signs:   Vitals:    07/27/23 0007   Temp: 97.5 °F (36.4 °C)       Notes/comments: Pt requesting epidural.         Zoë Howard MD 7/27/2023 1:57 AM

## 2023-07-27 NOTE — ANESTHESIA POSTPROCEDURE EVALUATION
Post-Op Assessment Note    CV Status:  Stable    Pain management: adequate     Mental Status:  Awake   Hydration Status:  Stable   PONV Controlled:  Controlled   Airway Patency:  Patent      Post Op Vitals Reviewed: Yes      Staff: Anesthesiologist     Post-op block assessment: site cleaned, no complications and catheter intact      No notable events documented.     BP      Temp     Pulse    Resp      SpO2      /64   Pulse (!) 53   Temp (!) 97 °F (36.1 °C)   Resp 18   Ht 5' 6.5" (1.689 m)   Wt 94.8 kg (209 lb)   LMP 11/01/2022   SpO2 99%   Breastfeeding Unknown   BMI 33.23 kg/m²

## 2023-07-27 NOTE — ASSESSMENT & PLAN NOTE
Continue routine post partum care  Encourage ambulation and breastfeeding/pumping  Declines post partum contraception, counseled on risks of close interval pregnancy

## 2023-07-27 NOTE — L&D DELIVERY NOTE
DELIVERY NOTE  Nora Redding 33 y.o. female MRN: [id number]  Unit/Bed#: L&D 324-01 Encounter: [id number]    Obstetrician:    Dr. Yury Brown DO    Assistant:   Dr. Ginger Hoover MD    Pre-Delivery Diagnosis:   Family history of thromboembolic disease   38 weeks gestation of pregnancy   Fetal growth restriction antepartum   Leakage of amniotic fluid   Asthma   Bariatric surgery status complicating pregnancy, third trimester       Post-Delivery Diagnosis:   Same as above - Delivered  Left labial laceration    Procedure:  Spontaneous vaginal delivery    Anesthesia:  epidural    Specimens:   Cord blood obtained   Placenta; normal appearing, eccentric insertion, intact   Arterial and venous blood gases (below)     Gases:  Umbilical Cord Venous Blood Gas:  Results from last 7 days   Lab Units 23  0641   PH COV  7.372   PCO2 COV mm HG 43.2*   HCO3 COV mmol/L 24.5   BASE EXC COV mmol/L -0.9*   O2 CT CD VB mL/dL 12.7   O2 HGB, VENOUS CORD % 47.3     Umbilical Cord Arterial Blood Gas:        Quantitative Blood Loss:   10 mL           Complications:    None    Brief Description of Labor Course:    Nora Redding is a 33 y.o.  at 38w2d wks who was initially admitted for rupture of membranes at /-2. She received epidural for anesthesia. She progressed on her own and began to have variable decelerations, and was found to be 10cm. She pushed and she delivered a viable male  on 23 at [phone]. Apgars were 8 (1 min) and 9 (5 min).  was transferred to  nursery. Patient tolerated the procedure well and was transferred to recovery in stable condition. Description of Delivery:   With  the assistance of maternal expulsive forces, the fetal vertex delivered spontaneously. The anterior right shoulder was delivered atraumatically with gentle downward traction. The contralateral arm was delivered with gentle upward traction and maternal expulsive forces.  The remainder of the fetus delivered spontaneously at [phone], resulting in a viable male . Upon delivery, the infant was placed on the mothers abdomen and the cord was doubly clamped and cut after 30 seconds. The  was noted to have good tone and cry spontaneously. There was no evidence of injury. The  was passed off to  staff for evaluation. Umbilical cord blood and umbilical artery and venous gases were collected and sent to the lab. An intact placenta was delivered spontaneously at 0644 using fundal massage and gentle cord traction and was noted to have a eccentrically-inserted 3-vessel cord. Active management of the third stage of labor was undertaken with IV pitocin at 250 milliunits/min. Inspection of the perineum, vagina, labia, cervix, and urethra revealed a left labial laceration. The laceration was hemostatic and did not require repair. Bleeding was noted to be under control. A bimanual exam was performed and uterine tone was firm.  Outcome:  Living  with APGARS 8 (1 min) and 9 (5 min).  weight: pending    At the conclusion of the delivery, all needle, sponge, and instrument counts were noted to be correct. Patient tolerated the procedure well and was allowed to recover in labor and delivery room with family and  before being transferred to the post-partum floor. Conclusion:  Mother and baby are currently recovering nicely in stable condition. Attending Supervision:   Dr. Fang Kyle DO was present for the entire procedure. Chrissie Randall MD   OB/GYN PGY-2  2023 7:16 AM    Attending Physician/Surgeon Statement  I was present for and participated in all key aspects of this patient's care. I agree with the resident's documentation as stated above.      Neida Davis  23  10:35 AM

## 2023-07-27 NOTE — ANESTHESIA PREPROCEDURE EVALUATION
Procedure:  LABOR ANALGESIA    Relevant Problems             PULMONARY   (+) Asthma     (+) nausea/vomiting     Physical Exam    Airway    Mallampati score: II  TM Distance: >3 FB  Neck ROM: full     Dental       Cardiovascular  Rhythm: regular, Rate: normal,     Pulmonary  Comment: Bilateral chest rise, not using accessory muscles for breathing,     Other Findings        Anesthesia Plan  ASA Score- 2     Anesthesia Type- epidural with ASA Monitors. Additional Monitors:   Airway Plan:           Plan Factors-    Chart reviewed. Existing labs reviewed. Induction-     Postoperative Plan-     Informed Consent- Anesthetic plan and risks discussed with patient.

## 2023-07-27 NOTE — ANESTHESIA PROCEDURE NOTES
Epidural Block    Patient location during procedure: OB  Start time: 7/27/2023 2:34 AM  Reason for block: at surgeon's request and primary anesthetic  Staffing  Performed by: Ernesto Alcala MD  Authorized by: Ernesto Alcala MD    Preanesthetic Checklist  Completed: patient identified, IV checked, site marked, risks and benefits discussed, surgical consent, monitors and equipment checked, pre-op evaluation and timeout performed  Epidural  Patient position: sitting  Prep: Betadine  Patient monitoring: heart rate, continuous pulse ox and frequent blood pressure checks  Approach: midline  Location: lumbar  Injection technique: LYRIC air  Needle  Needle type: Tuohy   Needle gauge: 18 G  Catheter type: side hole  Catheter size: 20 G  Catheter at skin depth: 12 cm  Catheter securement method: clear occlusive dressing  Test dose: negative  Assessment  Number of attempts: 1negative aspiration for CSF, negative aspiration for heme and no paresthesia on injection  patient tolerated the procedure well with no immediate complications

## 2023-07-27 NOTE — OB LABOR/OXYTOCIN SAFETY PROGRESS
Labor Progress Note - Catherine Leal 33 y.o. female MRN: [id number]    Unit/Bed#: L&D 482-44 Encounter: [id number]    Dose (markos-units/min) Oxytocin: 0 markos-units/min  Contraction Frequency (minutes): 4-5  Contraction Quality: Moderate      Cervical Dilation: 5-6        Cervical Effacement: 80  Fetal Station: -1  Baseline Rate: 120 bpm     FHR Category: Category I               Vital Signs:   Vitals:    07/27/23 0351   BP: 105/58   Pulse: 73   Resp: 18   Temp:    SpO2:        Notes/comments: Patient prefers to continue laboring without pitocin augmentation despite minimal change.         Samuel Wren MD 7/27/2023 4:10 AM

## 2023-07-27 NOTE — DISCHARGE SUMMARY
Discharge Summary - OB/GYN   Boogie Carter 33 y.o. female MRN: [id number]  Unit/Bed#: L&D 324-01 Encounter: [id number]      Admission Date: 2023     Discharge Date: 23    Admitting Diagnosis:   1. Pregnancy at 38w2d  2. IUGR  3. History of bariatric surgery    Discharge Diagnosis:   Same, delivered    Procedures: spontaneous vaginal delivery    Delivering Attending: Bev Roth DO  Discharge Attending: DO Jenny Davis Cha, MD)    Hospital Course:     Boogie Carter is a 33 y.o.  at 38w2d wks who was initially admitted for rupture of membranes at /2. She received epidural for anesthesia. She progressed on her own and began to have variable decelerations, and was found to be 10cm. She pushed and she delivered a viable male  on 23 at [phone]. Weight 5lbs 9.2oz via spontaneous vaginal delivery. Apgars were 8 (1 min) and 9 (5 min).  was transferred to  nursery. Patient tolerated the procedure well and was transferred to recovery in stable condition. Her post-partum course was uncomplicated. Her post-partum pain was well controlled with oral analgesics. On day of discharge, she was ambulating and able to reasonably perform all ADLs. She was voiding and had appropriate bowel function. Pain was well controlled. She was discharged home on post-partum day #2 without complications. Patient stayed an additional day due to baby's status. Patient was instructed to follow up with her OB as an outpatient and was given appropriate warnings to call provider if she develops signs of infection or uncontrolled pain. Complications: none apparent    Condition at discharge: good     Discharge instructions/Information to patient and family:   See after visit summary for information provided to patient and family. Provisions for Follow-Up Care:  See after visit summary for information related to follow-up care and any pertinent home health orders. Disposition: Home    Planned Readmission: No    Discharge Medications: For a complete list of the patient's medications, please refer to her med rec. Gama Hughes MD  OBGYN PGY-3  07/28/23 6:56 AM     Attending/Teaching Physician Statement  I have participated in the care of this patient during this hospitalization and agree with the discharge summary.     Leona Davis DO  07/30/2023  11:59 AM

## 2023-07-28 PROCEDURE — NC001 PR NO CHARGE: Performed by: OBSTETRICS & GYNECOLOGY

## 2023-07-28 RX ORDER — DOCUSATE SODIUM 100 MG/1
100 CAPSULE, LIQUID FILLED ORAL 2 TIMES DAILY
Refills: 0 | Status: CANCELLED
Start: 2023-07-28

## 2023-07-28 RX ORDER — ACETAMINOPHEN 325 MG/1
650 TABLET ORAL EVERY 4 HOURS PRN
Refills: 0
Start: 2023-07-28

## 2023-07-28 RX ORDER — IBUPROFEN 200 MG
600 TABLET ORAL EVERY 6 HOURS
Start: 2023-07-28

## 2023-07-28 RX ORDER — DIPHENHYDRAMINE HCL 25 MG
25 TABLET ORAL EVERY 6 HOURS PRN
Qty: 30 TABLET | Refills: 0 | Status: CANCELLED
Start: 2023-07-28

## 2023-07-28 RX ORDER — CALCIUM CARBONATE 500 MG/1
1000 TABLET, CHEWABLE ORAL DAILY PRN
Refills: 0 | Status: CANCELLED
Start: 2023-07-28

## 2023-07-28 RX ORDER — DIAPER,BRIEF,INFANT-TODD,DISP
1 EACH MISCELLANEOUS DAILY PRN
Qty: 30 G | Refills: 0 | Status: CANCELLED
Start: 2023-07-28

## 2023-07-28 NOTE — LACTATION NOTE
This note was copied from a baby's chart. CONSULT - LACTATION  Baby Boy Johnna Winchester 1 days male MRN: [id number]    [address] [place] [address] NURSERY Room / Bed: L&D 307(N)/L&D 307(N) Encounter: [id number]    Maternal Information     MOTHER:  Nora Palafox  Maternal Age: 33 y.o.   OB History: # 1 - Date: None, Sex: None, Weight: None, GA: None, Delivery: None, Apgar1: None, Apgar5: None, Living: None, Birth Comments: None    # 2 - Date: 23, Sex: Male, Weight: 2530 g (5 lb 9.2 oz), GA: 38w2d, Delivery: Vaginal, Spontaneous, Apgar1: 8, Apgar5: 9, Living: Living, Birth Comments: None   Previouse breast reduction surgery? No    Lactation history:   Has patient previously breast fed: No   How long had patient previously breast fed:     Previous breast feeding complications:       Past Surgical History:   Procedure Laterality Date   • GASTRIC BYPASS      22        Birth information:  YOB: 2022   Time of birth: 6:41 AM   Sex: male   Delivery type: Vaginal, Spontaneous   Birth Weight: 2530 g (5 lb 9.2 oz)   Percent of Weight Change: -3%     Gestational Age: 43w1d   [unfilled]    Assessment     Breast and nipple assessment: inverted nipple    [place] Assessment: sleepy    Feeding assessment: no latch  LATCH:  Latch: Too sleepy or reluctant, no latch achieved   Audible Swallowing: None   Type of Nipple:  Inverted (right side)   Comfort (Breast/Nipple): Soft/non-tender   Hold (Positioning): No assist from staff, mother able to position/hold infant   LATCH Score: 4          23 0900   Lactation Consultation   Reason for Consult 10 minutes;10 min   LATCH Documentation   Latch 0   Audible Swallowing 0   Type of Nipple 0  (right side)   Comfort (Breast/Nipple) 2   Hold (Positioning) 2   LATCH Score 4   Having latch problems?   (sleepy, but parents report improved latching with swallowing auscultated at other feedings.)   Position(s) Used [employer] Breasts/Nipples   Left Breast Filling   Right Breast Filling   Breastfeeding Progress Breastfeeding well; Not yet established   Patient Follow-Up   Follow-Up Type Inpatient   Other OB Lactation Documentation    Additional Problem Noted Parents report cluster feeding and diaper counts WNL. Reviewed where to find topics of interst in D/C booklet. Feeding recommendations:  breast feed on demand     Met with mother to go over discharge breastfeeding booklet including the feeding log. Emphasized 8 or more (12) feedings in a 24 hour period, what to expect for the number of diapers per day of life and the progression of properties of the  stooling pattern. Reviewed breastfeeding and your lifestyle, storage and preparation of breast milk, how to keep you breast pump clean, the employed breastfeeding mother and paced bottle feeding handouts. Booklet included Breastfeeding Resources for after discharge including access to the number for the [employer]. Discussed s/s engorgement, blocked milk ducts, and mastitis. Discussed how to remedy at home and when to contact physician. Encouraged parents to call for assistance, questions, and concerns about breastfeeding. Extension provided.       Sabrina Yost RN 2023 9:19 AM

## 2023-07-28 NOTE — PROGRESS NOTES
Progress Note - OB/GYN  Fermin Hutton 33 y.o. female MRN: [id number]  Unit/Bed#: L&D 307-01 Encounter: [id number]    Assessment and Plan     Fermin Hutton is a patient of: [facility]. She is PPD# 1 s/p  spontaneous vaginal delivery  Recovering well and is stable       Bariatric surgery status complicating pregnancy, third trimester  Assessment & Plan  Patient reports in    Avoid NSAIDs    *  (spontaneous vaginal delivery)  Assessment & Plan  Continue routine post partum care  Encourage ambulation and breastfeeding/pumping  Declines post partum contraception, counseled on risks of close interval pregnancy    Disposition    - Anticipate discharge home on PPD# 1 - today, after baby is cleared      Subjective/Objective     Chief Complaint: Postpartum State     Subjective:    Fermin Hutton is PPD#1 s/p  spontaneous vaginal delivery. She has no current complaints. Pain is well controlled. Patient is currently voiding. She is ambulating. Patient is currently passing flatus and has had no bowel movement. She is tolerating PO, and denies nausea or vomitting. Patient denies fever, chills, chest pain, shortness of breath, or calf tenderness. Lochia is minimal. She is  Breastfeeding. She is recovering well and is stable. Vitals:   BP 93/54 (BP Location: Right arm) Comment: RN Notified  Pulse 59   Temp 97.6 °F (36.4 °C) (Temporal)   Resp 16   Ht 5' 6.5" (1.689 m)   Wt 94.8 kg (209 lb)   LMP 2022   SpO2 99%   Breastfeeding Yes   BMI 33.23 kg/m²       Intake/Output Summary (Last 24 hours) at 2023 0659  Last data filed at 2023 1700  Gross per 24 hour   Intake --   Output 1400 ml   Net -1400 ml       Invasive Devices     Peripheral Intravenous Line  Duration           Peripheral IV 23 Dorsal (posterior); Right Hand 1 day                Physical Exam:   GEN: Fermin Hutton appears well, alert and oriented x 3, pleasant and cooperative   CARDIO: RRR, no murmurs or rubs  RESP:  CTAB, no wheezes or rales  ABDOMEN: soft, no tenderness, no distention, fundus @ level of umbilicus  EXTREMITIES: SCDs on, non tender, no erythema, b/l Toni's sign negative      Labs:     Hemoglobin   Date Value Ref Range Status   07/27/2023 10.7 (L) 11.5 - 15.4 g/dL Final   05/10/2018 13.3 11.5 - 15.4 g/dL Final     WBC   Date Value Ref Range Status   07/27/2023 8.63 4.31 - 10.16 Thousand/uL Final   05/10/2018 7.82 4.31 - 10.16 Thousand/uL Final     Platelets   Date Value Ref Range Status   07/27/2023 249 149 - 390 Thousands/uL Final   05/10/2018 252 149 - 390 Thousands/uL Final     Creatinine   Date Value Ref Range Status   05/10/2018 0.81 0.60 - 1.30 mg/dL Final     Comment:     Standardized to IDMS reference method     AST   Date Value Ref Range Status   05/10/2018 16 5 - 45 U/L Final     Comment:       Specimen collection should occur prior to Sulfasalazine administration due to the potential for falsely depressed results. ALT   Date Value Ref Range Status   05/10/2018 29 12 - 78 U/L Final     Comment:       Specimen collection should occur prior to Sulfasalazine and/or Sulfapyridine administration due to the potential for falsely depressed results.            Luis Webster MD  7/28/2023  6:59 AM

## 2023-07-29 VITALS
OXYGEN SATURATION: 98 % | SYSTOLIC BLOOD PRESSURE: 94 MMHG | RESPIRATION RATE: 18 BRPM | WEIGHT: 209 LBS | BODY MASS INDEX: 32.8 KG/M2 | HEIGHT: 67 IN | TEMPERATURE: 99 F | DIASTOLIC BLOOD PRESSURE: 60 MMHG | HEART RATE: 71 BPM

## 2023-07-29 PROCEDURE — 99024 POSTOP FOLLOW-UP VISIT: CPT | Performed by: OBSTETRICS & GYNECOLOGY

## 2023-07-29 RX ADMIN — ACETAMINOPHEN 325MG 650 MG: 325 TABLET ORAL at 01:21

## 2023-07-29 NOTE — PLAN OF CARE
Problem: POSTPARTUM  Goal: Experiences normal postpartum course  Description: INTERVENTIONS:  - Monitor maternal vital signs  - Assess uterine involution and lochia  2023 by Gene Mccloud RN  Outcome: Completed  2023 by Gene Mccloud RN  Outcome: Adequate for Discharge  Goal: Appropriate maternal -  bonding  Description: INTERVENTIONS:  - Identify family support  - Assess for appropriate maternal/infant bonding   -Encourage maternal/infant bonding opportunities  - Referral to  or  as needed  2023 by Gene Mccloud RN  Outcome: Completed  2023 by Gene Mccloud RN  Outcome: Adequate for Discharge  Goal: Establishment of infant feeding pattern  Description: INTERVENTIONS:  - Assess breast/bottle feeding  - Refer to lactation as needed  2023 by Gene Mccloud RN  Outcome: Completed  2023 by Gene Mccloud RN  Outcome: Adequate for Discharge     Problem: PAIN - ADULT  Goal: Verbalizes/displays adequate comfort level or baseline comfort level  Description: Interventions:  - Encourage patient to monitor pain and request assistance  - Assess pain using appropriate pain scale  - Administer analgesics based on type and severity of pain and evaluate response  - Implement non-pharmacological measures as appropriate and evaluate response  - Consider cultural and social influences on pain and pain management  - Notify physician/advanced practitioner if interventions unsuccessful or patient reports new pain  2023 by Gene Mccloud RN  Outcome: Completed  2023 by Gene Mccloud RN  Outcome: Adequate for Discharge     Problem: INFECTION - ADULT  Goal: Absence or prevention of progression during hospitalization  Description: INTERVENTIONS:  - Assess and monitor for signs and symptoms of infection  - Monitor lab/diagnostic results  - Monitor all insertion sites, i.e. indwelling lines, tubes, and drains  - Monitor endotracheal if appropriate and nasal secretions for changes in amount and color  - [place] appropriate cooling/warming therapies per order  - Administer medications as ordered  - Instruct and encourage patient and family to use good hand hygiene technique  - Identify and instruct in appropriate isolation precautions for identified infection/condition  7/29/2023 1005 by Candelaria Yun RN  Outcome: Completed  7/29/2023 0828 by Candelaria Yun RN  Outcome: Adequate for Discharge  Goal: Absence of fever/infection during neutropenic period  Description: INTERVENTIONS:  - Monitor WBC    7/29/2023 1005 by Candelaria Yun RN  Outcome: Completed  7/29/2023 0828 by Candelaria Ynu RN  Outcome: Adequate for Discharge     Problem: SAFETY ADULT  Goal: Patient will remain free of falls  Description: INTERVENTIONS:  - Educate patient/family on patient safety including physical limitations  - Instruct patient to call for assistance with activity   - Consult OT/PT to assist with strengthening/mobility   - Keep Call bell within reach  - Keep bed low and locked with side rails adjusted as appropriate  - Keep care items and personal belongings within reach  - Initiate and maintain comfort rounds  - Make Fall Risk Sign visible to staff  - Offer Toileting every Hours, in advance of need  - Initiate/Maintain alarm  - Obtain necessary fall risk management equipment:   - Apply yellow socks and bracelet for high fall risk patients  - Consider moving patient to room near nurses station  7/29/2023 1005 by Candelaria Yun RN  Outcome: Completed  7/29/2023 0828 by Candelaria Yun RN  Outcome: Adequate for Discharge  Goal: Maintain or return to baseline ADL function  Description: INTERVENTIONS:  -  Assess patient's ability to carry out ADLs; assess patient's baseline for ADL function and identify physical deficits which impact ability to perform ADLs (bathing, care of mouth/teeth, toileting, grooming, dressing, etc.)  - Assess/evaluate cause of self-care deficits   - Assess range of motion  - Assess patient's mobility; develop plan if impaired  - Assess patient's need for assistive devices and provide as appropriate  - Encourage maximum independence but intervene and supervise when necessary  - Involve family in performance of ADLs  - Assess for home care needs following discharge   - Consider OT consult to assist with ADL evaluation and planning for discharge  - Provide patient education as appropriate  7/29/2023 1005 by Sofi Guaman RN  Outcome: Completed  7/29/2023 0828 by Sofi Guaman RN  Outcome: Adequate for Discharge  Goal: Maintains/Returns to pre admission functional level  Description: INTERVENTIONS:  - Perform BMAT or MOVE assessment daily.   - Set and communicate daily mobility goal to care team and patient/family/caregiver. - Collaborate with rehabilitation services on mobility goals if consulted  - Perform Range of Motion  times a day. - Reposition patient every hours. - Dangle patient  times a day  - Stand patient  times a day  - Ambulate patient  times a day  - Out of bed to chair  times a day   - Out of bed for meals times a day  - Out of bed for toileting  - Record patient progress and toleration of activity level   7/29/2023 1005 by Sofi Guaman RN  Outcome: Completed  7/29/2023 0828 by Sofi Guaman RN  Outcome: Adequate for Discharge     Problem: Knowledge Deficit  Goal: Patient/family/caregiver demonstrates understanding of disease process, treatment plan, medications, and discharge instructions  Description: Complete learning assessment and assess knowledge base.   Interventions:  - Provide teaching at level of understanding  - Provide teaching via preferred learning methods  7/29/2023 1005 by Sofi Guaman RN  Outcome: Completed  7/29/2023 0828 by Sofi Guaman RN  Outcome: Adequate for Discharge     Problem: DISCHARGE PLANNING  Goal: Discharge to home or other facility with appropriate resources  Description: INTERVENTIONS:  - Identify barriers to discharge w/patient and caregiver  - Arrange for needed discharge resources and transportation as appropriate  - Identify discharge learning needs (meds, wound care, etc.)  - Arrange for interpretive services to assist at discharge as needed  - Refer to Case Management Department for coordinating discharge planning if the patient needs post-hospital services based on physician/advanced practitioner order or complex needs related to functional status, cognitive ability, or social support system  7/29/2023 1005 by Arthur Valle, RN  Outcome: Completed  7/29/2023 0828 by Arthur Valle, RN  Outcome: Adequate for Discharge

## 2023-07-29 NOTE — PLAN OF CARE
Problem: POSTPARTUM  Goal: Experiences normal postpartum course  Description: INTERVENTIONS:  - Monitor maternal vital signs  - Assess uterine involution and lochia  Outcome: Adequate for Discharge  Goal: Appropriate maternal -  bonding  Description: INTERVENTIONS:  - Identify family support  - Assess for appropriate maternal/infant bonding   -Encourage maternal/infant bonding opportunities  - Referral to  or  as needed  Outcome: Adequate for Discharge  Goal: Establishment of infant feeding pattern  Description: INTERVENTIONS:  - Assess breast/bottle feeding  - Refer to lactation as needed  Outcome: Adequate for Discharge     Problem: PAIN - ADULT  Goal: Verbalizes/displays adequate comfort level or baseline comfort level  Description: Interventions:  - Encourage patient to monitor pain and request assistance  - Assess pain using appropriate pain scale  - Administer analgesics based on type and severity of pain and evaluate response  - Implement non-pharmacological measures as appropriate and evaluate response  - Consider cultural and social influences on pain and pain management  - Notify physician/advanced practitioner if interventions unsuccessful or patient reports new pain  Outcome: Adequate for Discharge     Problem: INFECTION - ADULT  Goal: Absence or prevention of progression during hospitalization  Description: INTERVENTIONS:  - Assess and monitor for signs and symptoms of infection  - Monitor lab/diagnostic results  - Monitor all insertion sites, i.e. indwelling lines, tubes, and drains  - Monitor endotracheal if appropriate and nasal secretions for changes in amount and color  - [place] appropriate cooling/warming therapies per order  - Administer medications as ordered  - Instruct and encourage patient and family to use good hand hygiene technique  - Identify and instruct in appropriate isolation precautions for identified infection/condition  Outcome: Adequate for Discharge  Goal: Absence of fever/infection during neutropenic period  Description: INTERVENTIONS:  - Monitor WBC    Outcome: Adequate for Discharge     Problem: SAFETY ADULT  Goal: Patient will remain free of falls  Description: INTERVENTIONS:  - Educate patient/family on patient safety including physical limitations  - Instruct patient to call for assistance with activity   - Consult OT/PT to assist with strengthening/mobility   - Keep Call bell within reach  - Keep bed low and locked with side rails adjusted as appropriate  - Keep care items and personal belongings within reach  - Initiate and maintain comfort rounds  - Make Fall Risk Sign visible to staff  - Offer Toileting every Hours, in advance of need  - Initiate/Maintain alarm  - Obtain necessary fall risk management equipment:   - Apply yellow socks and bracelet for high fall risk patients  - Consider moving patient to room near nurses station  Outcome: Adequate for Discharge  Goal: Maintain or return to baseline ADL function  Description: INTERVENTIONS:  -  Assess patient's ability to carry out ADLs; assess patient's baseline for ADL function and identify physical deficits which impact ability to perform ADLs (bathing, care of mouth/teeth, toileting, grooming, dressing, etc.)  - Assess/evaluate cause of self-care deficits   - Assess range of motion  - Assess patient's mobility; develop plan if impaired  - Assess patient's need for assistive devices and provide as appropriate  - Encourage maximum independence but intervene and supervise when necessary  - Involve family in performance of ADLs  - Assess for home care needs following discharge   - Consider OT consult to assist with ADL evaluation and planning for discharge  - Provide patient education as appropriate  Outcome: Adequate for Discharge  Goal: Maintains/Returns to pre admission functional level  Description: INTERVENTIONS:  - Perform BMAT or MOVE assessment daily.   - Set and communicate daily mobility goal to care team and patient/family/caregiver. - Collaborate with rehabilitation services on mobility goals if consulted  - Perform Range of Motion  times a day. - Reposition patient every  hours. - Dangle patient  times a day  - Stand patient  times a day  - Ambulate patient  times a day  - Out of bed to chair  times a day   - Out of bed for meals  times a day  - Out of bed for toileting  - Record patient progress and toleration of activity level   Outcome: Adequate for Discharge     Problem: Knowledge Deficit  Goal: Patient/family/caregiver demonstrates understanding of disease process, treatment plan, medications, and discharge instructions  Description: Complete learning assessment and assess knowledge base.   Interventions:  - Provide teaching at level of understanding  - Provide teaching via preferred learning methods  Outcome: Adequate for Discharge     Problem: DISCHARGE PLANNING  Goal: Discharge to home or other facility with appropriate resources  Description: INTERVENTIONS:  - Identify barriers to discharge w/patient and caregiver  - Arrange for needed discharge resources and transportation as appropriate  - Identify discharge learning needs (meds, wound care, etc.)  - Arrange for interpretive services to assist at discharge as needed  - Refer to Case Management Department for coordinating discharge planning if the patient needs post-hospital services based on physician/advanced practitioner order or complex needs related to functional status, cognitive ability, or social support system  Outcome: Adequate for Discharge   --

## 2023-07-29 NOTE — PROGRESS NOTES
Progress Note - OB/GYN  Prudence Tsai 33 y.o. female MRN: [id number]  Unit/Bed#: L&D 307-01 Encounter: [id number]    Assessment and Plan     Prudence Tsai is a patient of: [facility]. She is PPD# 2 s/p  spontaneous vaginal delivery  Recovering well and is stable       Bariatric surgery status complicating pregnancy, third trimester  Assessment & Plan  Patient reports in    Avoid NSAIDs    *  (spontaneous vaginal delivery)  Assessment & Plan  Continue routine post partum care  Encourage ambulation and breastfeeding/pumping  Declines post partum contraception, counseled on risks of close interval pregnancy    Disposition    - Anticipate discharge home on PPD#2      Subjective/Objective     Chief Complaint: Postpartum State     Subjective:    Prudence Tsai is PPD#2 s/p  spontaneous vaginal delivery. She has no current complaints. Pain is well controlled. Patient is currently voiding. She is ambulating. Patient is currently passing flatus and has had no bowel movement. She is tolerating PO, and denies nausea or vomitting. Patient denies fever, chills, chest pain, shortness of breath, or calf tenderness. Lochia is minimal. She is  Breastfeeding. She is recovering well and is stable.        Vitals:   BP 94/60 (BP Location: Right arm)   Pulse 71   Temp 99 °F (37.2 °C) (Oral)   Resp 18   Ht 5' 6.5" (1.689 m)   Wt 94.8 kg (209 lb)   LMP 2022   SpO2 98%   Breastfeeding Yes   BMI 33.23 kg/m²     No intake or output data in the 24 hours ending 23 0808    Invasive Devices     None                 Physical Exam:   GEN: Prudence Tsai appears well, alert and oriented x 3, pleasant and cooperative   CARDIO: RRR, no murmurs or rubs  RESP:  CTAB, no wheezes or rales  ABDOMEN: soft, no tenderness, no distention, fundus @ level of umbilicus  EXTREMITIES: SCDs on, non tender, no erythema, b/l Toni's sign negative      Labs:     Hemoglobin   Date Value Ref Range Status   07/27/2023 10.7 (L) 11.5 - 15.4 g/dL Final   05/10/2018 13.3 11.5 - 15.4 g/dL Final     WBC   Date Value Ref Range Status   07/27/2023 8.63 4.31 - 10.16 Thousand/uL Final   05/10/2018 7.82 4.31 - 10.16 Thousand/uL Final     Platelets   Date Value Ref Range Status   07/27/2023 249 149 - 390 Thousands/uL Final   05/10/2018 252 149 - 390 Thousands/uL Final     Creatinine   Date Value Ref Range Status   05/10/2018 0.81 0.60 - 1.30 mg/dL Final     Comment:     Standardized to IDMS reference method     AST   Date Value Ref Range Status   05/10/2018 16 5 - 45 U/L Final     Comment:       Specimen collection should occur prior to Sulfasalazine administration due to the potential for falsely depressed results. ALT   Date Value Ref Range Status   05/10/2018 29 12 - 78 U/L Final     Comment:       Specimen collection should occur prior to Sulfasalazine and/or Sulfapyridine administration due to the potential for falsely depressed results.            Yuni Watts MD  7/29/2023  8:08 AM

## 2023-07-29 NOTE — PLAN OF CARE

## 2023-07-29 NOTE — LACTATION NOTE
This note was copied from a baby's chart. In to see family today. Mom states baby is latching OK but having difficulty on right side. Encouraged football hold based on hand dominance. Guided dyad to deep latch. Stimulated to keep rocker suckling. Mom noted better suckling and comfort with positioning. Dad also shown signs of good latch/feed and how he can assist dyad while learning. Baby nursed well till sluggish at breast with relaxed tone. Baby burped. Then to left breast using cross cradle hold. Hand over hand guidance to deep latch. Parents able to maintain feed. Left to bond and build confidence for discharge Home later today. 07/29/23 0820   LATCH Documentation   Latch 2   Audible Swallowing 1   Type of Nipple 2  (flatter on right to start)   Comfort (Breast/Nipple) 2   Hold (Positioning) 1   LATCH Score 8   Having latch problems? No  (working on consistent deep latch)   Position(s) Used Football;Cross Cradle   Breasts/Nipples   Left Breast Filling   Right Breast Filling   Left Nipple Everted   Right Nipple Flat;Everted   Intervention Other (comment); Hand expression  (helped with positioning/maintaining deep latch)   Breastfeeding Progress Not yet established;Breastfeeding well   Breast Pump   Pump 3  (has [place])     Reminded of goals establishing breastfeeding and outpatient support available. Encouraged parents to call for assistance, questions, and concerns about breastfeeding. Extension provided.

## 2023-08-01 PROCEDURE — 88307 TISSUE EXAM BY PATHOLOGIST: CPT | Performed by: PATHOLOGY

## 2024-03-12 ENCOUNTER — INITIAL PRENATAL (OUTPATIENT)
Dept: OBGYN CLINIC | Facility: CLINIC | Age: 34
End: 2024-03-12

## 2024-03-12 ENCOUNTER — TELEPHONE (OUTPATIENT)
Age: 34
End: 2024-03-12

## 2024-03-12 VITALS — DIASTOLIC BLOOD PRESSURE: 74 MMHG | WEIGHT: 190 LBS | BODY MASS INDEX: 30.21 KG/M2 | SYSTOLIC BLOOD PRESSURE: 112 MMHG

## 2024-03-12 DIAGNOSIS — Z36.9 UNSPECIFIED ANTENATAL SCREENING: ICD-10-CM

## 2024-03-12 DIAGNOSIS — N91.1 SECONDARY AMENORRHEA: Primary | ICD-10-CM

## 2024-03-12 NOTE — PROGRESS NOTES
Subjective  Patient ID: Rema Esposito is a 33 y.o. female here for Amenorrhea (Early ultrasound-amenorrhea. /Unknown LMP.  on 23/Stopped BF 3 months ago. /No VB,Cramping /No Nausea/vomiting )    Newly Pregnant  No LMP recorded (lmp unknown). Patient is pregnant. +UPT mid February.     Menstrual cycle: irregular, patient was breast feeding and immediately postpartum  Pregnancy was unplanned/surpised.   She has started taking a prenatal vitamin    She is C/O amenorrhea  Signs and symptoms of pregnancy:   Breast tenderness: no  Fatigue: no  Cramping or Pelvic Pain: yes  Spotting or Vaginal Bleeding: no  Nausea or vomiting: no    OB History    Para Term  AB Living   3 2 2 0 0 2   SAB IAB Ectopic Multiple Live Births         0 1      # Outcome Date GA Lbr Lior/2nd Weight Sex Delivery Anes PTL Lv   3 Current            2 Term 23 38w2d / 00:02 2530 g (5 lb 9.2 oz) M Vag-Spont EPI N KOKI   1 Term                 The following portions of the patient's history were reviewed and updated as appropriate: allergies, current medications, past family history, past medical history, past social history, past surgical history, and problem list.    Perinent hx that may affect pregnancy:  Asthma- not currently an issue for her  Hx bariatric surgery 2022      Review of Systems    See HPI for pertinent positives.            /74 (BP Location: Left arm, Patient Position: Sitting, Cuff Size: Standard)   Wt 86.2 kg (190 lb)   LMP  (LMP Unknown)   BMI 30.21 kg/m²   OBGyn Exam  Results for orders placed or performed during the hospital encounter of 23   CBC   Result Value Ref Range    WBC 8.63 4.31 - 10.16 Thousand/uL    RBC 3.65 (L) 3.81 - 5.12 Million/uL    Hemoglobin 10.7 (L) 11.5 - 15.4 g/dL    Hematocrit 33.4 (L) 34.8 - 46.1 %    MCV 92 82 - 98 fL    MCH 29.3 26.8 - 34.3 pg    MCHC 32.0 31.4 - 37.4 g/dL    RDW 12.9 11.6 - 15.1 %    Platelets 249 149 - 390 Thousands/uL    MPV 11.8  8.9 - 12.7 fL   L&D GREEN / YELLOW ON HOLD   Result Value Ref Range    Extra Tube hold    RPR-Syphilis Screening (Total Syphilis IGG/IGM)   Result Value Ref Range    Syphilis Total Antibody Non-reactive Non-Reactive   CORD, Blood gas, arterial   Result Value Ref Range    pH, Cord Art 7.311 7.230 - 7.430    pCO2, Cord Art 45.5 30.0 - 60.0    pO2, Cord Art 16.0 5.0 - 25.0 mm HG    HCO3, Cord Art 22.4 17.3 - 27.3 mmol/L    Base Exc, Cord Art -3.9 (L) 3.0 - 11.0 mmol/L    O2 Content, Cord Art 7.2 ml/dl    O2 Hgb, Arterial Cord 32.0 %   CORD, Blood gas, venous   Result Value Ref Range    pH, Cord Brian 7.372 7.190 - 7.490    pCO2, Cord Brian 43.2 (H) 27.0 - 43.0 mm HG    pO2, Cord Brian 22.5 15.0 - 45.0 mm HG    HCO3, Cord Brian 24.5 12.2 - 28.6 mmol/L    Base Exc, Cord Brian -0.9 (L) 1.0 - 9.0 mmol/L    O2 Cont, Cord Brian 12.7 mL/dL    O2 HGB,VENOUS CORD 55.9 %   Type and screen   Result Value Ref Range    ABO Grouping B     Rh Factor Positive     Antibody Screen Negative     Specimen Expiration Date 20230730    ABORh Recheck - Contact Blood Bank Prior to Collection   Result Value Ref Range    ABO Grouping B     Rh Factor Positive    Tissue Exam OB (Placenta) Only   Result Value Ref Range    Case Report       Surgical Pathology Report                         Case: [id number]                                   Authorizing Provider:  Reyna Arauz MD             Collected:           07/27/2023 0744              Ordering Location:     [facility]        Received:            07/27/2023 [address] [place] [address] Labor and                                                                                 Delivery                                                                     Pathologist:           Amelia Glaser MD                                                         Specimen:    Placenta                                                                                   Final Diagnosis       A.  Placenta, vaginal delivery:  - Small intact mature placenta with centrally inserted, hypercoiled three-vessel umbilical cord.    -- Focal squamous metaplasia of amnion of umbilical cord.     -- Small perivascular cord hematoma.   - Placental size & weight (344 grams) are < 3rd percentile for 38.2 week gestation with     uniform & appropriate villous morphology.  - Acute chorioamnionitis, maternal and fetal inflammatory response stage 1, grade 1 (mild     to moderate intensity).    -- Acute subchorionitis. Acute chorionic vasculitis.   - No deciduitis, no funisitis and no villitis seen.  - Amnionic pigmented macrophages consistent with meconium staining and/or siderosis.     -- Amnionic epithelial necrosis suggestive of prolonged meconium exposure.   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   - No fetal vascular malperfusion.   - No malformative conditions and no neoplasia seen.         Additional Information       All reported additional testing was performed with appropriately reactive controls.  These tests were developed and their performance characteristics determined by [facility] or appropriate performing facility, though some tests may be performed on tissues which have not been validated for performance characteristics (such as staining performed on alcohol exposed cell blocks and decalcified tissues).  Results should be interpreted with caution and in the context of the patients’ clinical condition. These tests may not be cleared or approved by the U.S. Food and Drug Administration, though the FDA has determined that such clearance or approval is not necessary. These tests are used for clinical purposes and they should not be regarded as investigational or for research. This  "laboratory has been approved by CLIA 88, designated as a high-complexity laboratory and is qualified to perform these tests.  Interpretation performed at [facility], [address] [facility] [address].      Gross Description       A. The specimen is received in formalin, labeled with the patient's name and hospital number, and is designated \"placenta.\"  The specimen consists of a barney disc placenta with attached umbilical cord and membranes.  The umbilical cord and membranes are removed leaving a 344 g disc that measures 17.2 x 16.3 x 2.2 cm.  The three-vessel, tightly spiraled, white umbilical cord measures 30 cm long and up to 1.2 cm wide and is centrally inserted 5.8 cm from the peripheral edge.  The smooth glistening translucent membranes are attached at the periphery and have a rupture site 2.9 cm from the nearest edge.  The fetal surface is bluegray, smooth, glistening, with an even distribution of blood vessels.  There are multiple areas of pale-tan subchorionic fibrin thinning accounts for less than 10% of the fetal surface.  The maternal surface appears intact.  There is an area of pale-tan rubbery tissue at the peripheral edge measuring 4.3 x 2.7 x 0.5 cm.  This accounts for less than 5% of the placental disc.  The remainder of the disc is spongy red-brown tissue.  Representative sections are submitted in 6 cassettes.  1: Umbilical cord  2: Membrane roll  3: Firmer tan tissue of disc  4: Disc with subchorionic fibrin  5: Disc at cord insertion site  6: Normal disc    Note: The estimated total formalin fixation time based upon information provided by the submitting clinician and the standard processing schedule is under 72 hours. TStevens         FIRST TRIMESTER OBSTETRIC ULTRASOUND     No LMP recorded (lmp unknown). Patient is pregnant.    INDICATION: Establish Gestational Age       FINDINGS:  See imaging report for details     Additional Findings: simple cyst on left ovary    FHR: " 165  IMPRESSION:    Single intrauterine pregnancy of 8 weeks 6 days gestational age  Fetal cardiac activity detected.  No adnexal masses seen.  EDC by LMP: N/A  EDC by this Ultrasound: 10/16/24    Assigning a Final EMMANUELLE  Please choose how you are assigning the EMMANUELLE: The LMP is unknown or uncertain, therefore the final EMMANUELLE will be based on today's ultrasound    Final EMMANUELLE: 10/16/24 by ultrasound at this encounter.    Roselia Pro DO  [address] [place] [address] [facility] [address] FLOOR  [facility] [address]  Dept: [phone]  Dept Fax: [phone]  Ultrasound Probe Disinfection    A transvaginal ultrasound was performed.   Prior to use, disinfection was performed with High Level Disinfection Process ([employer]).  Probe serial number: [id number] was used.          Assessment/Plan:    Early pregnancy at 8 weeks 6 days with a calculated EMMANUELLE of 10/16/24 based on ultrasound    - Genetic screening options reviewed. She is interested in NIPT, so MFM referral placed  - Prenatal care reviewed  - Pregnancy precautions reviewed    RTO for OB interview and PN-1 visit

## 2024-03-14 ENCOUNTER — TELEPHONE (OUTPATIENT)
Age: 34
End: 2024-03-14

## 2024-03-20 ENCOUNTER — TELEPHONE (OUTPATIENT)
Age: 34
End: 2024-03-20

## 2024-03-22 DIAGNOSIS — Z34.81 PRENATAL CARE, SUBSEQUENT PREGNANCY, FIRST TRIMESTER: Primary | ICD-10-CM

## 2024-03-28 ENCOUNTER — INITIAL PRENATAL (OUTPATIENT)
Dept: OBGYN CLINIC | Facility: CLINIC | Age: 34
End: 2024-03-28

## 2024-03-28 DIAGNOSIS — Z36.9 UNSPECIFIED ANTENATAL SCREENING: ICD-10-CM

## 2024-03-28 DIAGNOSIS — Z31.430 ENCOUNTER OF FEMALE FOR TESTING FOR GENETIC DISEASE CARRIER STATUS FOR PROCREATIVE MANAGEMENT: ICD-10-CM

## 2024-03-28 DIAGNOSIS — Z34.81 PRENATAL CARE, SUBSEQUENT PREGNANCY, FIRST TRIMESTER: Primary | ICD-10-CM

## 2024-03-28 PROCEDURE — OBC

## 2024-03-28 NOTE — PROGRESS NOTES
OB INTAKE INTERVIEW  Patient is 33 y.o.y.o. who presents for OB intake at 11-1 wks  She is accompanied by FOB & infant son during this encounter  The father of her baby (Jass) is involved in the pregnancy and is 31 years old.      Last Menstrual Period: unknown  Ultrasound: Measured 8 weeks 6 days on 3/12/24  Estimated Date of Delivery: 10/16/24 via US     Signs/Symptoms of Pregnancy  Current pregnancy symptoms: nausea  Constipation no  Headaches YES- tylenol  Cramping/spotting YES-tylenol  PICA cravings no    Diabetes-    If patient has 1 or more, please order early 1 hour GTT  History of GDM no  BMI >35 no  History of PCOS or current metformin use YES  History of LGA/macrosomic infant (4000g/9lbs) no    If patient has 2 or more, please order early 1 hour GTT  BMI>30 YES  AMA no  First degree relative with type 2 diabetes YES- pt's MOM   History of chronic HTN, hyperlipidemia, elevated A1C no  High risk race (, , ,  or ) YES    Hypertension- if you answer yes to any of the following, please order baseline preeclampsia labs (cbc, comprehensive metabolic panel, urine protein creatinine ratio, uric acid)  History of of chronic HTN no  History of gestational HTN no  History of preeclampsia, eclampsia, or HELLP syndrome no  History of diabetes no  History of lupus, autoimmune disease, kidney disease no    Thyroid- if yes order TSH with reflex T4  History of thyroid disease no    Bleeding Disorder or Hx of DVT-patient or first degree relative with history of. Order the following if not done previously.   (Factor V, antithrombin III, prothrombin gene mutation, protein C and S Ag, lupus anticoagulant, anticardiolipin, beta-2 glycoprotein)   no    OB/GYN-  History of abnormal pap smear no       Date of last pap smear 22  wnl  History of HPV no  History of Herpes/HSV no  History of other STI (gonorrhea, chlamydia, trich) no  History of prior   YES  History of prior  no  History of  delivery prior to 36 weeks 6 days no  History of blood transfusion no  Ok for blood transfusion yes    Substance screening- if yes outside of tobacco for her or anyone in her home-order urine drug screen  History of tobacco use no  Currently using tobacco no  Currently using alcohol no  Presently using drugs no  Past drug use  no  IV drug use- no  Partner drug use no  Parent/Family drug use no    MRSA Screening-   Does the pt have a hx of MRSA? no    Immunizations:  Influenza vaccine given this season no  Discussed Tdap vaccine yes  Discussed COVID Vaccine yes- pt has not received vaccines.    Genetic/MFM-  Do you or your partner have a history of any of the following in yourselves or first degree relatives?  Cystic fibrosis no  Spinal muscular atrophy no  Hemoglobinopathy/Sickle Cell/Thalassemia no  Fragile X Intellectual Disability no    If yes, discuss Carrier Screening and recommend consultation with MFM/Genetic Counseling and place specific [facility] Referral for.    If no, discuss Carrier Screening being completed once in a lifetime as a standard of care lab test. Place orders for Cystic Fibrosis Gene Test (RBF768) and Spinal Muscular Atrophy DNA ([id number])      Appointment for Nuchal Translucency Ultrasound at [facility] scheduled for no- pt declined.    Interview education  [facility] Pregnancy Essentials Book reviewed, discussed and attached to their AVS yes    Nurse/Family Partnership- patient may qualify no; referral placed no    Prenatal lab work scripts yes  Extra labs ordered:  Bariatric labs, CF, SMA    Aspirin/Preeclampsia Screen    Risk Level Risk Factor Recommendation   LOW Prior Uncomplicated full-term delivery YES No Aspirin recommendation        MODERATE Nulliparity no Recommend low-dose aspirin if     BMI>30 YES 2 or more moderate risk factors    Family History Preeclampsia (mother/sister) no     35yr old or greater no      or Low  Socioeconomic no     IVF Pregnancy  no     Personal History Risks (low birth weight, prior adverse preg outcome, >10yr preg interval) no         HIGH History of Preeclampsia no Recommend low-dose aspirin if     Multifetal gestation no 1 or more high risk factors    Chronic HTN no     Type 1 or 2 Diabetes no     Renal Disease no     Autoimmune Disease  no      Contraindications to ASA therapy:  NSAID/ ASA allergy: no  Nasal polyps: no  Asthma with history of ASA induced bronchospasm: no  Relative contraindications:  History of GI bleed: no  Active peptic ulcer disease: no  Severe hepatic dysfunction: no    Patient should be recommended to take ASA 162mg during this pregnancy from 12-36wks to lower her risk of preeclampsia: no      The patient has a history now or in prior pregnancy notable for:   hx of 2 prior vag deliveries, ,   Hx of FGR with prev. Preg.,  Hx of Narcolepsy,  Hx of Bariatric surgery,  Hx of Asthma,  BMI, 30.2,  Baby diagnosed with G6PD        Details that I feel the provider should be aware of: Pt requested CF & SMA testing. Pt unable to tolerate one hour glucola, due to hx of bariatric surgery- referral place to diabetic pathways for glucose meter testing.     PN1 visit scheduled. The patient was oriented to our practice, reviewed delivering physicians and [facility] for Delivery. All questions were answered. Pt & FOB, Jass & their 8 mo old son present for Intake.  Pt has a 13 y.o.  from a previous relationship & 8 mo old with Jass. - they have been together since her daughter was a year old.  He is supportive of pregnancy.   PN bldwk , including CF, SMA, & bariatric labs ordered in epic.  Advised pt to await  authorization phone call prior to having bldwk drawn. Pt has PN1 appt scheduled for 4/10/24.  Advised pt to call with any further questions/concerns.       Interviewed by: Meera Callaway, RN, CLC

## 2024-03-28 NOTE — PATIENT INSTRUCTIONS
Congratulations!! Please review our Pregnancy Essential Guide and [facility] L&D Virtual tour from our networks website.     [facility] Pregnancy Essentials Guide  [facility] ([email or web address])     [facility] - Virtual Tour ([email or web address])        Pregnancy at 11 to 14 Weeks   AMBULATORY CARE:   Changes happening to your body:  You are now at the end of your first trimester and entering your second trimester. Morning sickness usually goes away by this time. You may have other symptoms such as fatigue, frequent urination, and headaches. You may have gained 2 to 4 pounds by now.  Seek care immediately if:   You have pain or cramping in your abdomen or low back.    You have heavy vaginal bleeding or clotting.    You pass material that looks like tissue or large clots. Collect the material and bring it with you.    Call your doctor or obstetrician if:   You cannot keep food or drinks down, and you are losing weight.    You have light vaginal bleeding.    You have chills or a fever.    You have vaginal itching, burning, or pain.    You have yellow, green, white, or foul-smelling vaginal discharge.    You have pain or burning when you urinate, less urine than usual, or pink or bloody urine.    You have questions or concerns about your condition or care.    How to care for yourself at this stage of your pregnancy:       Get plenty of rest.  You may feel more tired than normal. You may need to take naps or go to bed earlier.    Manage nausea and vomiting.  Avoid fatty and spicy foods. Eat small meals throughout the day instead of large meals. Annia may help to decrease nausea. Ask your healthcare provider about other ways of decreasing nausea and vomiting.    Eat a variety of healthy foods.  Healthy foods include fruits, vegetables, whole-grain breads, low-fat dairy foods, beans, lean meats, and fish. Drink liquids as directed. Ask how much liquid to drink each day and which liquids are best for you.  Limit caffeine to less than 200 milligrams each day. Limit your intake of fish to 2 servings each week. Choose fish low in mercury such as canned light tuna, shrimp, salmon, cod, or tilapia. Do not  eat fish high in mercury such as swordfish, tilefish, gagandeep mackerel, and shark.         Take prenatal vitamins as directed.  Your need for certain vitamins and minerals, such as folic acid, increases during pregnancy. Prenatal vitamins provide some of the extra vitamins and minerals you need. Prenatal vitamins may also help to decrease the risk of certain birth defects.         Do not smoke.  Smoking increases your risk of a miscarriage and other health problems during your pregnancy. Smoking can cause your baby to be born too early or weigh less at birth. Ask your healthcare provider for information if you need help quitting.    Do not drink alcohol.  Alcohol passes from your body to your baby through the placenta. It can affect your baby's brain development and cause fetal alcohol syndrome (FAS). FAS is a group of conditions that causes mental, behavior, and growth problems.    Talk to your healthcare provider before you take any medicines.  Many medicines may harm your baby if you take them when you are pregnant. Do not take any medicines, vitamins, herbs, or supplements without first talking to your healthcare provider. Never use illegal or street drugs (such as marijuana or cocaine) while you are pregnant.  Safety tips during pregnancy:   Avoid hot tubs and saunas.  Do not use a hot tub or sauna while you are pregnant, especially during your first trimester. Hot tubs and saunas may raise your baby's temperature and increase the risk of birth defects.    Avoid toxoplasmosis.  This is an infection caused by eating raw meat or being around infected cat feces. It can cause birth defects, miscarriages, and other problems. Wash your hands after you touch raw meat. Make sure any meat is well-cooked before you eat it. Avoid  raw eggs and unpasteurized milk. Use gloves or ask someone else to clean your cat's litter box while you are pregnant.    Changes happening with your baby:  Your baby has fully formed fingernails and toenails. Your baby's heartbeat can now be heard. Ask your healthcare provider if you can listen to your baby's heartbeat. By week 14, your baby is over 4 inches long from the top of the head to the rump (baby's bottom). Your baby weighs over 3 ounces.  Prenatal care:  Prenatal care is a series of visits with your healthcare provider throughout your pregnancy. During the first 28 weeks of your pregnancy, you will see your healthcare provider 1 time each month. Prenatal care can help prevent problems during pregnancy and childbirth. Your healthcare provider will check your blood pressure and weight. Your baby's heart rate will also be checked. You may also need the following at some visits:  A pelvic exam  allows your healthcare provider to see your cervix (the bottom part of your uterus). Your healthcare provider will use a speculum to open your vagina. He or she will check the size and shape of your uterus.    Blood tests  may be done to check for any of the following:    Gestational diabetes or anemia (low iron level)    Blood type or Rh factor, or certain birth defects    Immunity to certain diseases, such as chickenpox or rubella    An infection, such as a sexually transmitted infection, HIV, or hepatitis B    Hepatitis B  may need to be prevented or treated. Hepatitis B is inflammation of the liver caused by the hepatitis B virus (HBV). HBV can spread from a mother to her baby during delivery. You will be checked for HBV as early as possible in the first trimester of each pregnancy. You need the test even if you received the hepatitis B vaccine or were tested before. You may need to have an HBV infection treated before you give birth.    Urine tests  may also be done to check for sugar and protein. These can be  signs of gestational diabetes or preeclampsia. Urine tests may also be done to check for signs of infection.    A fetal ultrasound  shows pictures of your baby inside your uterus. The pictures are used to check your baby's development, movement, and position.         Genetic disorder screening tests  may be offered to you. These tests check your baby's risk for genetic disorders such as Down syndrome. A screening test includes a blood test and ultrasound.    Follow up with your doctor or obstetrician as directed:  Go to all prenatal visits. Write down your questions so you remember to ask them during your visits.  © Copyright [employer] 2023 Information is for End User's use only and may not be sold, redistributed or otherwise used for commercial purposes.  The above information is an  only. It is not intended as medical advice for individual conditions or treatments. Talk to your doctor, nurse or pharmacist before following any medical regimen to see if it is safe and effective for you.

## 2024-04-02 ENCOUNTER — TELEPHONE (OUTPATIENT)
Dept: PERINATAL CARE | Facility: CLINIC | Age: 34
End: 2024-04-02

## 2024-04-10 ENCOUNTER — INITIAL PRENATAL (OUTPATIENT)
Dept: OBGYN CLINIC | Facility: CLINIC | Age: 34
End: 2024-04-10
Payer: COMMERCIAL

## 2024-04-10 VITALS
SYSTOLIC BLOOD PRESSURE: 110 MMHG | WEIGHT: 185 LBS | BODY MASS INDEX: 29.73 KG/M2 | DIASTOLIC BLOOD PRESSURE: 60 MMHG | HEIGHT: 66 IN

## 2024-04-10 DIAGNOSIS — Z11.51 SCREENING FOR HPV (HUMAN PAPILLOMAVIRUS): ICD-10-CM

## 2024-04-10 DIAGNOSIS — Z82.79 FAMILY HISTORY OF CLEFT LIP: ICD-10-CM

## 2024-04-10 DIAGNOSIS — Z87.42 HISTORY OF PCOS: ICD-10-CM

## 2024-04-10 DIAGNOSIS — Z01.419 ENCOUNTER FOR GYNECOLOGICAL EXAMINATION WITHOUT ABNORMAL FINDING: ICD-10-CM

## 2024-04-10 DIAGNOSIS — Z34.81 PRENATAL CARE, SUBSEQUENT PREGNANCY, FIRST TRIMESTER: Primary | ICD-10-CM

## 2024-04-10 DIAGNOSIS — Z11.3 SCREEN FOR STD (SEXUALLY TRANSMITTED DISEASE): ICD-10-CM

## 2024-04-10 DIAGNOSIS — J45.20 MILD INTERMITTENT ASTHMA WITHOUT COMPLICATION: ICD-10-CM

## 2024-04-10 DIAGNOSIS — Z82.0 FAMILY HISTORY OF NARCOLEPSY: ICD-10-CM

## 2024-04-10 DIAGNOSIS — O99.843 BARIATRIC SURGERY STATUS COMPLICATING PREGNANCY, THIRD TRIMESTER: ICD-10-CM

## 2024-04-10 DIAGNOSIS — O09.299 HISTORY OF INTRAUTERINE GROWTH RESTRICTION IN PRIOR PREGNANCY, CURRENTLY PREGNANT: ICD-10-CM

## 2024-04-10 DIAGNOSIS — O99.210 OBESITY AFFECTING PREGNANCY, ANTEPARTUM, UNSPECIFIED OBESITY TYPE: ICD-10-CM

## 2024-04-10 DIAGNOSIS — Z3A.13 13 WEEKS GESTATION OF PREGNANCY: ICD-10-CM

## 2024-04-10 PROBLEM — O36.5990 FETAL GROWTH RESTRICTION ANTEPARTUM: Status: RESOLVED | Noted: 2023-07-14 | Resolved: 2024-04-10

## 2024-04-10 PROBLEM — Z3A.32 32 WEEKS GESTATION OF PREGNANCY: Status: RESOLVED | Noted: 2023-06-15 | Resolved: 2024-04-10

## 2024-04-10 PROBLEM — Z82.49 FAMILY HISTORY OF THROMBOEMBOLIC DISEASE: Status: RESOLVED | Noted: 2023-03-30 | Resolved: 2024-04-10

## 2024-04-10 LAB
SL AMB  POCT GLUCOSE, UA: ABNORMAL
SL AMB POCT URINE PROTEIN: ABNORMAL

## 2024-04-10 PROCEDURE — 81002 URINALYSIS NONAUTO W/O SCOPE: CPT | Performed by: PHYSICIAN ASSISTANT

## 2024-04-10 PROCEDURE — PNV: Performed by: PHYSICIAN ASSISTANT

## 2024-04-10 NOTE — PROGRESS NOTES
Patient presents for Pn1 visit.     LMP-  EMMANUELLE- 10/16/2024  GA- 13w0d    Last pap- 22 neg  GC Swab collected today.     Pn1 labs not completed  MFM not scheduled   Blue folder given at in take   No LOF, bleeding, cramping or discharge.   No questions or concerns for today's visit.

## 2024-04-10 NOTE — ASSESSMENT & PLAN NOTE
Taking PNV  Advised to call MFM to schedule Level 2 and diabetic pathways consult for testing supplies due to gastric bypass status

## 2024-04-10 NOTE — PROGRESS NOTES
33 y.o.  at 13w0d with Estimated Date of Delivery: 10/16/24 by 1st trimester US given LMP unknown    She denies nausea/vomiting and bleeding/cramping.     Prenatal labs: reminded to complete     Genetic screening: Declining carrier testing, NIPT, NT    OB history: HX of full term  x 2, Hx of FGR in prior pregnancy, Baby diagnosed with G6PD - not interested in carrier testing     GYN history: Last pap smear 2022 neg/neg.     History of STDs: none     Past medical history: Hx of PCOS, BMI 30, hx of narcolepsy, hx of gastric bypass surgery, hx of asthma    Past surgical history: hx of gastric bypass    Social history: Denies tobacco, alcohol, or illicit drug use.    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   Abdomen is soft and nontender without masses.   External genitals are normal without lesions or rashes.  Vagina is normal without discharge or bleeding.   Cervix is normal without discharge or lesion.   Uterus is normal for gestational age.   Adnexa are normal, nontender, without palpable mass.        Discussed as well during this visit was diet, prenatal vitamins, prenatal visits, lab testing, breast feeding, vaccinations, maternal fetal medicine consultations, and lifestyle.      ASSESSMENT/PLAN   Problem List Items Addressed This Visit       Asthma     Has not had flare up since gastric bypass         Bariatric surgery status complicating pregnancy, third trimester     Cannot tolerate 1 hour  Advised to contact DP for testing supplies for one week of screening         Family history of cleft lip     FOB with hx of cleft lip and palate repaired by age  13  Call MFM to schedule Level 2         13 weeks gestation of pregnancy     Taking PNV  Advised to call MFM to schedule Level 2 and diabetic pathways consult for testing supplies due to gastric bypass status         Maternal obesity affecting pregnancy, antepartum    History of intrauterine growth restriction in prior pregnancy, currently pregnant     Hx of FGR with last pregnancy         History of PCOS    Family history of narcolepsy     Has never required medications for this          Other Visit Diagnoses       Prenatal care, subsequent pregnancy, first trimester    -  Primary    Relevant Orders    POCT urine dip    Chlamydia/GC RADHA, Confirmation    Encounter for gynecological examination without abnormal finding        Screening for HPV (human papillomavirus)        Screen for STD (sexually transmitted disease)                1 - Gonorrhea and Chlamydia cultures obtained today  2 - Pap smear with HPV up to date  3 - Prenatal labs reminded to complete  4 - Genetic screening - declines carrier testing, NIPT, NT  5 - RTO in 4 weeks for routine PN visit    Blue packet given and reviewed     All questions were answered & Rema expressed understanding.

## 2024-04-14 LAB
C TRACH RRNA SPEC QL NAA+PROBE: NEGATIVE
N GONORRHOEA RRNA SPEC QL NAA+PROBE: NEGATIVE

## 2024-05-08 ENCOUNTER — TELEPHONE (OUTPATIENT)
Age: 34
End: 2024-05-08

## 2024-05-08 ENCOUNTER — ROUTINE PRENATAL (OUTPATIENT)
Dept: OBGYN CLINIC | Facility: CLINIC | Age: 34
End: 2024-05-08

## 2024-05-08 VITALS
HEART RATE: 80 BPM | DIASTOLIC BLOOD PRESSURE: 54 MMHG | WEIGHT: 189.8 LBS | BODY MASS INDEX: 31.1 KG/M2 | SYSTOLIC BLOOD PRESSURE: 96 MMHG

## 2024-05-08 DIAGNOSIS — O99.210 OBESITY AFFECTING PREGNANCY, ANTEPARTUM, UNSPECIFIED OBESITY TYPE: ICD-10-CM

## 2024-05-08 DIAGNOSIS — Z36.9 ENCOUNTER FOR ANTENATAL SCREENING: ICD-10-CM

## 2024-05-08 DIAGNOSIS — O99.842 BARIATRIC SURGERY STATUS COMPLICATING PREGNANCY, SECOND TRIMESTER: ICD-10-CM

## 2024-05-08 DIAGNOSIS — Z34.82 PRENATAL CARE, SUBSEQUENT PREGNANCY, SECOND TRIMESTER: Primary | ICD-10-CM

## 2024-05-08 DIAGNOSIS — Z3A.17 17 WEEKS GESTATION OF PREGNANCY: ICD-10-CM

## 2024-05-08 DIAGNOSIS — O09.299 HISTORY OF INTRAUTERINE GROWTH RESTRICTION IN PRIOR PREGNANCY, CURRENTLY PREGNANT: ICD-10-CM

## 2024-05-08 PROCEDURE — PNV: Performed by: PHYSICIAN ASSISTANT

## 2024-05-08 NOTE — ASSESSMENT & PLAN NOTE
MFM referral given for education on fingerstick testing for early screening.   Vitamin levels all within normal range on early PN labs.

## 2024-05-08 NOTE — ASSESSMENT & PLAN NOTE
Rema  is a 33 y.o.  @17w0d who presents for routine prenatal visit.    Denies OB complaints.     PN - completed - see care everywhere -- normal  Given MFM referral to complete early diabetic screening    Genetic screening - declined  AFP - order provided   NT scan declined   Level II - not scheduled - MFM referral give and reviewed importance of this.     TWG -2.268 kg (-5 lb)    + fetal movement.  Denies contractions, cramping, leakage of fluid or vaginal bleeding.     Reviewed PTL precautions and reasons to call.

## 2024-05-08 NOTE — PROGRESS NOTES
Problem List Items Addressed This Visit       Bariatric surgery status complicating pregnancy, second trimester     MFM referral given for education on fingerstick testing for early screening.   Vitamin levels all within normal range on early PN labs.          Relevant Orders    Ambulatory Referral to Maternal Fetal Medicine    17 weeks gestation of pregnancy     Rema  is a 33 y.o.  @17w0d who presents for routine prenatal visit.    Denies OB complaints.     PN - completed - see care everywhere -- normal  Given MFM referral to complete early diabetic screening    Genetic screening - declined  AFP - order provided   NT scan declined   Level II - not scheduled - MFM referral give and reviewed importance of this.     TWG -2.268 kg (-5 lb)    + fetal movement.  Denies contractions, cramping, leakage of fluid or vaginal bleeding.     Reviewed PTL precautions and reasons to call.           Relevant Orders    Ambulatory Referral to Maternal Fetal Medicine    Maternal obesity affecting pregnancy, antepartum     Pregravid BMI 31.   No WG thus far in pregnancy.   Encouraged to schedule level II.          Relevant Orders    Ambulatory Referral to Maternal Fetal Medicine    History of intrauterine growth restriction in prior pregnancy, currently pregnant    Relevant Orders    Ambulatory Referral to Maternal Fetal Medicine     Other Visit Diagnoses       Prenatal care, subsequent pregnancy, second trimester    -  Primary    Relevant Orders    Ambulatory Referral to Maternal Fetal Medicine    Encounter for  screening        Relevant Orders    Alpha fetoprotein, maternal

## 2024-05-08 NOTE — PROGRESS NOTES
Patient here for prenatal visit.  17 wk  She is feeling lightheaded, has nausea and headaches; denies spotting.  She is feeling movement.  PN labs not completed; order for AFP screen placed today.   Level II US scheduled: no  She was unable to provide urine specimen today

## 2024-05-16 ENCOUNTER — TELEPHONE (OUTPATIENT)
Dept: PERINATAL CARE | Facility: CLINIC | Age: 34
End: 2024-05-16

## 2024-05-16 NOTE — TELEPHONE ENCOUNTER
No Show Documentation  Diabetes and Pregnancy Program    Rema Esposito was a no show today (05/16/24) for meter education and diet review r/t H/O bariatric surgery. Pt needs to complete 1wk of SMBG to rule out GDM d/t pt being unable to tolerate GTT d/t H/O bariatric surgery.    Called pt. No Answer. Left VM. Sent [employer] Message. Pt was instructed to reach out to call [phone] to reschedule.    Nesha Rodriguez RD  Diabetes Educator   [facility]  Diabetes and Pregnancy Program

## 2024-05-29 ENCOUNTER — TELEPHONE (OUTPATIENT)
Dept: PERINATAL CARE | Facility: CLINIC | Age: 34
End: 2024-05-29

## 2024-05-29 NOTE — TELEPHONE ENCOUNTER
Reason for Call: Appointment (Schedule 60min appointment for meter education + diet review; Previously no show on 5/16/24) and High Risk Gestation (H/O Bariatric Surgery; Currently 20w0d)    Outcome: No Answer. No Voicemail. Sent [employer] Message.    Nesha Rodriguez RD  Diabetes Educator   [facility]  Diabetes and Pregnancy Program

## 2024-06-07 ENCOUNTER — ROUTINE PRENATAL (OUTPATIENT)
Facility: HOSPITAL | Age: 34
End: 2024-06-07
Payer: COMMERCIAL

## 2024-06-07 ENCOUNTER — TELEPHONE (OUTPATIENT)
Dept: OBGYN CLINIC | Facility: CLINIC | Age: 34
End: 2024-06-07

## 2024-06-07 VITALS
HEIGHT: 66 IN | WEIGHT: 198.8 LBS | SYSTOLIC BLOOD PRESSURE: 118 MMHG | DIASTOLIC BLOOD PRESSURE: 62 MMHG | BODY MASS INDEX: 31.95 KG/M2 | HEART RATE: 83 BPM

## 2024-06-07 DIAGNOSIS — Z3A.21 21 WEEKS GESTATION OF PREGNANCY: ICD-10-CM

## 2024-06-07 DIAGNOSIS — O99.210 OBESITY AFFECTING PREGNANCY, ANTEPARTUM, UNSPECIFIED OBESITY TYPE: ICD-10-CM

## 2024-06-07 DIAGNOSIS — Z36.86 ENCOUNTER FOR ANTENATAL SCREENING FOR CERVICAL LENGTH: Primary | ICD-10-CM

## 2024-06-07 DIAGNOSIS — O09.899 SHORT INTERVAL BETWEEN PREGNANCIES AFFECTING PREGNANCY, ANTEPARTUM: ICD-10-CM

## 2024-06-07 DIAGNOSIS — O09.299 HISTORY OF INTRAUTERINE GROWTH RESTRICTION IN PRIOR PREGNANCY, CURRENTLY PREGNANT: ICD-10-CM

## 2024-06-07 DIAGNOSIS — O28.3 NUCHAL FOLD THICKENING ON PRENATAL ULTRASOUND: ICD-10-CM

## 2024-06-07 DIAGNOSIS — O99.842 BARIATRIC SURGERY STATUS COMPLICATING PREGNANCY, SECOND TRIMESTER: ICD-10-CM

## 2024-06-07 DIAGNOSIS — Z84.89 FAMILY HISTORY OF GENETIC DISORDER: ICD-10-CM

## 2024-06-07 DIAGNOSIS — Z36.3 ENCOUNTER FOR ANTENATAL SCREENING FOR MALFORMATION: ICD-10-CM

## 2024-06-07 DIAGNOSIS — Z34.82 PRENATAL CARE, SUBSEQUENT PREGNANCY, SECOND TRIMESTER: ICD-10-CM

## 2024-06-07 PROCEDURE — 76811 OB US DETAILED SNGL FETUS: CPT | Performed by: STUDENT IN AN ORGANIZED HEALTH CARE EDUCATION/TRAINING PROGRAM

## 2024-06-07 PROCEDURE — 76817 TRANSVAGINAL US OBSTETRIC: CPT | Performed by: STUDENT IN AN ORGANIZED HEALTH CARE EDUCATION/TRAINING PROGRAM

## 2024-06-07 PROCEDURE — 99214 OFFICE O/P EST MOD 30 MIN: CPT | Performed by: STUDENT IN AN ORGANIZED HEALTH CARE EDUCATION/TRAINING PROGRAM

## 2024-06-07 NOTE — PROGRESS NOTES
"[facility]: Ms. Gómez Esposito was seen today for anatomic survey and cervical length screening ultrasound.  See ultrasound report under \"OB Procedures\" tab.        Physical Exam  Constitutional:       General: She is not in acute distress.     Appearance: Normal appearance.   HENT:      Head: Normocephalic and atraumatic.   Eyes:      Extraocular Movements: Extraocular movements intact.   Cardiovascular:      Rate and Rhythm: Normal rate.   Pulmonary:      Effort: Pulmonary effort is normal. No respiratory distress.   Skin:     Findings: No erythema or rash.   Neurological:      Mental Status: She is alert and oriented to person, place, and time.   Psychiatric:         Mood and Affect: Mood normal.         Behavior: Behavior normal.         Please don't hesitate to contact our office with any concerns or questions.  -Brittany Bourgeois MD      "

## 2024-06-07 NOTE — LETTER
"2024     Ira Rodriguez PA-C  [address]  [address]  [place] [address]    Patient: Rema Esposito   YOB: 1990   Date of Visit: 2024       Dear Dr. Rodriguez:    Thank you for referring Rema Esposito to me for evaluation. Below are my notes for this consultation.    If you have questions, please do not hesitate to call me. I look forward to following your patient along with you.         Sincerely,        Brittany Bourgeois MD        CC: No Recipients    Brittany Bourgeois MD  2024  5:24 PM  Sign when Signing Visit  [facility]: Ms. Gómez Esposito was seen today for anatomic survey and cervical length screening ultrasound.  See ultrasound report under \"OB Procedures\" tab.        Physical Exam  Constitutional:       General: She is not in acute distress.     Appearance: Normal appearance.   HENT:      Head: Normocephalic and atraumatic.   Eyes:      Extraocular Movements: Extraocular movements intact.   Cardiovascular:      Rate and Rhythm: Normal rate.   Pulmonary:      Effort: Pulmonary effort is normal. No respiratory distress.   Skin:     Findings: No erythema or rash.   Neurological:      Mental Status: She is alert and oriented to person, place, and time.   Psychiatric:         Mood and Affect: Mood normal.         Behavior: Behavior normal.         Please don't hesitate to contact our office with any concerns or questions.  -Brittany Bourgeois MD         "

## 2024-06-07 NOTE — PROGRESS NOTES
Ultrasound Probe Disinfection    A transvaginal ultrasound was performed.   Prior to use, disinfection was performed with High Level Disinfection Process (360pion).  Probe serial number [id number] was used.      Marilin Holbrook  06/07/24  2:38 PM

## 2024-06-07 NOTE — TELEPHONE ENCOUNTER
----- Message from Meera BARBA sent at 3/28/2024  8:28 AM EDT -----  Regardinnd trimester call  -24    Called pt in order to complete 2nd trimester p.c. lmtcb   EPDS sent via my chart ; pt notified.

## 2024-06-18 ENCOUNTER — TELEPHONE (OUTPATIENT)
Facility: HOSPITAL | Age: 34
End: 2024-06-18

## 2024-06-18 NOTE — TELEPHONE ENCOUNTER
Reason for Call: No Show (Medical Nutrition Therapy appointment today)    Outcome: Patient was a no show to virtual visit scheduled for today at 8:30am; Called patient, no answer, left voicemail to reschedule.    Marly Fajardo, MPH, RDN, LDN, [facility]  Diabetes Educator   [facility]  Diabetes and Pregnancy Program

## 2024-07-02 ENCOUNTER — ROUTINE PRENATAL (OUTPATIENT)
Dept: OBGYN CLINIC | Facility: CLINIC | Age: 34
End: 2024-07-02
Payer: COMMERCIAL

## 2024-07-02 VITALS
BODY MASS INDEX: 32.93 KG/M2 | HEART RATE: 66 BPM | SYSTOLIC BLOOD PRESSURE: 108 MMHG | OXYGEN SATURATION: 100 % | DIASTOLIC BLOOD PRESSURE: 68 MMHG | WEIGHT: 204 LBS

## 2024-07-02 DIAGNOSIS — O28.3 NUCHAL FOLD THICKENING ON PRENATAL ULTRASOUND: ICD-10-CM

## 2024-07-02 DIAGNOSIS — Z84.89 FAMILY HISTORY OF GENETIC DISORDER: ICD-10-CM

## 2024-07-02 DIAGNOSIS — O09.299 HISTORY OF INTRAUTERINE GROWTH RESTRICTION IN PRIOR PREGNANCY, CURRENTLY PREGNANT: ICD-10-CM

## 2024-07-02 DIAGNOSIS — O09.899 SHORT INTERVAL BETWEEN PREGNANCIES AFFECTING PREGNANCY, ANTEPARTUM: ICD-10-CM

## 2024-07-02 DIAGNOSIS — O99.210 OBESITY AFFECTING PREGNANCY, ANTEPARTUM, UNSPECIFIED OBESITY TYPE: ICD-10-CM

## 2024-07-02 DIAGNOSIS — Z3A.24 24 WEEKS GESTATION OF PREGNANCY: ICD-10-CM

## 2024-07-02 DIAGNOSIS — Z34.82 PRENATAL CARE, SUBSEQUENT PREGNANCY, SECOND TRIMESTER: Primary | ICD-10-CM

## 2024-07-02 DIAGNOSIS — O99.842 BARIATRIC SURGERY STATUS COMPLICATING PREGNANCY, SECOND TRIMESTER: ICD-10-CM

## 2024-07-02 LAB
SL AMB  POCT GLUCOSE, UA: NEGATIVE
SL AMB POCT URINE PROTEIN: ABNORMAL

## 2024-07-02 PROCEDURE — 81002 URINALYSIS NONAUTO W/O SCOPE: CPT | Performed by: PHYSICIAN ASSISTANT

## 2024-07-02 PROCEDURE — PNV: Performed by: PHYSICIAN ASSISTANT

## 2024-07-02 NOTE — PROGRESS NOTES
24 weeks gestation of pregnancy  Rema  is a 33 y.o.  @24w6d who presents for routine prenatal visit.    Denies OB complaints.     NIPT- declined  MASFP- did not complete  Level II - complete, third trimester US scheduled  28 week lab orders placed today  She is gastric bypass pt and will need to complete fingerstick checks. She is declining DP referral. She has testing supplies she can borrow from her mother. She will start checks QID at 26 wks and report via [employer]. If elevations will need referral to DP  Plans to breastfeed.     Good fetal movement.  Denies contractions, cramping, leakage of fluid or vaginal bleeding.     Reviewed PTL precautions and reasons to call.        Nuchal fold thickening on prenatal ultrasound  Declined NIPT or diagnostic testing  Third trimester growth US scheduled    Family history of genetic disorder  Son with G6PD deficiency  Declined genetic counseling    History of intrauterine growth restriction in prior pregnancy, currently pregnant  Third trimester growth US scheduled    Maternal obesity affecting pregnancy, antepartum  Third trimester growth scan scheduled    Short interval between pregnancies affecting pregnancy, antepartum  Third trimester growth US scheduled    Bariatric surgery status complicating pregnancy, second trimester  Advised to start fingerstick checks at 26 wks

## 2024-07-02 NOTE — ASSESSMENT & PLAN NOTE
Rema  is a 33 y.o.  @24w6d who presents for routine prenatal visit.    Denies OB complaints.     NIPT- declined  MASFP- did not complete  Level II - complete, third trimester US scheduled  28 week lab orders placed today  She is gastric bypass pt and will need to complete fingerstick checks. She is declining DP referral. She has testing supplies she can borrow from her mother. She will start checks QID at 26 wks and report via [employer]. If elevations will need referral to DP  Plans to breastfeed.     Good fetal movement.  Denies contractions, cramping, leakage of fluid or vaginal bleeding.     Reviewed PTL precautions and reasons to call.

## 2024-07-02 NOTE — PROGRESS NOTES
Patient here for prenatal visit.   GA: 24w6d    Denies leaking of fluid, bleeding, cramping   Normal Fetal Movement  28 wk labs ordered today  Growth Scan scheduled 24  No concerns at this time.

## 2024-07-25 NOTE — PROGRESS NOTES
This patient received  care under my supervision at [facility].  The note is contained in the ultrasound report located under OB Procedures tab in Epic.  Please call our office at [phone] with questions.  -Janet Bustillo MD    The time spent on this patient on the encounter date included 5 minutes previsit service time reviewing records and precharting, 5 minutes face-to-face service time counseling regarding results and coordinating care, and  4 minutes charting, totalling 14 minutes.

## 2024-07-26 ENCOUNTER — ULTRASOUND (OUTPATIENT)
Facility: HOSPITAL | Age: 34
End: 2024-07-26
Payer: COMMERCIAL

## 2024-07-26 VITALS
SYSTOLIC BLOOD PRESSURE: 120 MMHG | DIASTOLIC BLOOD PRESSURE: 60 MMHG | HEART RATE: 79 BPM | BODY MASS INDEX: 32.92 KG/M2 | HEIGHT: 66 IN | WEIGHT: 204.81 LBS

## 2024-07-26 DIAGNOSIS — O09.899 SHORT INTERVAL BETWEEN PREGNANCIES AFFECTING PREGNANCY, ANTEPARTUM: Primary | ICD-10-CM

## 2024-07-26 DIAGNOSIS — Z3A.28 28 WEEKS GESTATION OF PREGNANCY: ICD-10-CM

## 2024-07-26 DIAGNOSIS — O99.842 BARIATRIC SURGERY STATUS COMPLICATING PREGNANCY, SECOND TRIMESTER: ICD-10-CM

## 2024-07-26 DIAGNOSIS — Z36.89 ENCOUNTER FOR ULTRASOUND TO CHECK FETAL GROWTH: ICD-10-CM

## 2024-07-26 DIAGNOSIS — O09.299 HISTORY OF INTRAUTERINE GROWTH RESTRICTION IN PRIOR PREGNANCY, CURRENTLY PREGNANT: ICD-10-CM

## 2024-07-26 PROCEDURE — 76816 OB US FOLLOW-UP PER FETUS: CPT | Performed by: OBSTETRICS & GYNECOLOGY

## 2024-07-26 PROCEDURE — 99212 OFFICE O/P EST SF 10 MIN: CPT | Performed by: OBSTETRICS & GYNECOLOGY

## 2024-08-02 ENCOUNTER — ROUTINE PRENATAL (OUTPATIENT)
Dept: OBGYN CLINIC | Facility: CLINIC | Age: 34
End: 2024-08-02
Payer: COMMERCIAL

## 2024-08-02 VITALS
SYSTOLIC BLOOD PRESSURE: 110 MMHG | HEART RATE: 83 BPM | OXYGEN SATURATION: 100 % | DIASTOLIC BLOOD PRESSURE: 70 MMHG | WEIGHT: 206.2 LBS | BODY MASS INDEX: 33.28 KG/M2

## 2024-08-02 DIAGNOSIS — Z23 NEED FOR HPV VACCINATION: ICD-10-CM

## 2024-08-02 DIAGNOSIS — O28.3 NUCHAL FOLD THICKENING ON PRENATAL ULTRASOUND: ICD-10-CM

## 2024-08-02 DIAGNOSIS — Z34.83 PRENATAL CARE, SUBSEQUENT PREGNANCY, THIRD TRIMESTER: ICD-10-CM

## 2024-08-02 DIAGNOSIS — O99.210 OBESITY AFFECTING PREGNANCY, ANTEPARTUM, UNSPECIFIED OBESITY TYPE: ICD-10-CM

## 2024-08-02 DIAGNOSIS — Z3A.29 29 WEEKS GESTATION OF PREGNANCY: Primary | ICD-10-CM

## 2024-08-02 DIAGNOSIS — O99.843 BARIATRIC SURGERY STATUS COMPLICATING PREGNANCY, THIRD TRIMESTER: ICD-10-CM

## 2024-08-02 DIAGNOSIS — O09.299 HISTORY OF INTRAUTERINE GROWTH RESTRICTION IN PRIOR PREGNANCY, CURRENTLY PREGNANT: ICD-10-CM

## 2024-08-02 LAB
SL AMB  POCT GLUCOSE, UA: NEGATIVE
SL AMB POCT URINE PROTEIN: ABNORMAL

## 2024-08-02 PROCEDURE — 90715 TDAP VACCINE 7 YRS/> IM: CPT | Performed by: PHYSICIAN ASSISTANT

## 2024-08-02 PROCEDURE — 90471 IMMUNIZATION ADMIN: CPT | Performed by: PHYSICIAN ASSISTANT

## 2024-08-02 PROCEDURE — 81002 URINALYSIS NONAUTO W/O SCOPE: CPT | Performed by: PHYSICIAN ASSISTANT

## 2024-08-02 PROCEDURE — PNV: Performed by: PHYSICIAN ASSISTANT

## 2024-08-02 NOTE — PROGRESS NOTES
Patient here for prenatal visit.   GA: 29w2d    Denies leaking of fluid, bleeding  Patient had some cramping yesterday  Patient was having sharp pains right under her belly button last night for about an hour.  Normal Fetal Movement  28wk labs still active  Yellow Folder given/reviewed  Breast Pump not needed  Peds Referral not needed  Delivery Consent signed today  Tdap given today, L deltoid, tolerated well, VIS given

## 2024-08-02 NOTE — ASSESSMENT & PLAN NOTE
Completed almost two weeks of fingerstick readings  Reminded to send in via [employer] to get signed off on GDM screening

## 2024-08-02 NOTE — ASSESSMENT & PLAN NOTE
Rema  is a 33 y.o.  @29w2d who presents for routine prenatal visit.      28 wk labs - reminded to complete CBC and RPR  She has completed about 2 wks of fingerstick readings. Did not bring log. Reminded to submit via [employer] to get signed off on GDM screening  Growth scan- EFW58% at 28 wks; f/u as clinically indicated  TDAP given today    Delivery consents signed today    The patient was counseled about the labor process. She was counseled regarding potential reasons that she may need a  section including arrest of dilation/descent, non-reassuring fetal status, or worsening maternal status.      She was counseled on the risks of  including bleeding, infection, and injury to surrounding structures including the bowel and bladder. She was counseled that there are medical and surgical methods to manage excessive postpartum bleeding. She was counseled that in the event of excessive blood loss, she may require blood transfusion which includes a small risk of blood borne diseases such as hepatitis and HIV. The patient is OK with receiving a blood transfusion if necessary.  The patient had an opportunity to ask questions and signed consent.      She was counseled about the possible need for operative delivery using the vacuum or forceps and the indications for doing so. She was counseled that there is a small risk of  complications including intracranial hemorrhage, lacerations, nerve damage or fracture as well as the increased risk for more severe maternal laceration. The patient signed consent.     Reports good fetal movement.  Denies LOF, vaginal bleeding, regular uterine contractions, cramping, headaches or visual changes.      Reviewed PTL/Labor precautions and FKC.

## 2024-08-02 NOTE — PROGRESS NOTES
29 weeks gestation of pregnancy  Rema  is a 33 y.o.  @29w2d who presents for routine prenatal visit.      28 wk labs - reminded to complete CBC and RPR  She has completed about 2 wks of fingerstick readings. Did not bring log. Reminded to submit via [employer] to get signed off on GDM screening  Growth scan- EFW58% at 28 wks; f/u as clinically indicated  TDAP given today    Delivery consents signed today    The patient was counseled about the labor process. She was counseled regarding potential reasons that she may need a  section including arrest of dilation/descent, non-reassuring fetal status, or worsening maternal status.      She was counseled on the risks of  including bleeding, infection, and injury to surrounding structures including the bowel and bladder. She was counseled that there are medical and surgical methods to manage excessive postpartum bleeding. She was counseled that in the event of excessive blood loss, she may require blood transfusion which includes a small risk of blood borne diseases such as hepatitis and HIV. The patient is OK with receiving a blood transfusion if necessary.  The patient had an opportunity to ask questions and signed consent.      She was counseled about the possible need for operative delivery using the vacuum or forceps and the indications for doing so. She was counseled that there is a small risk of  complications including intracranial hemorrhage, lacerations, nerve damage or fracture as well as the increased risk for more severe maternal laceration. The patient signed consent.     Reports good fetal movement.  Denies LOF, vaginal bleeding, regular uterine contractions, cramping, headaches or visual changes.      Reviewed PTL/Labor precautions and FKC.        Bariatric surgery status complicating pregnancy, third trimester  Completed almost two weeks of fingerstick readings  Reminded to send in via [employer] to get signed off on GDM  screening    Maternal obesity affecting pregnancy, antepartum  TWG16#  Normal third trimester growth    History of intrauterine growth restriction in prior pregnancy, currently pregnant  Normal third trimester growth    Nuchal fold thickening on prenatal ultrasound  Normal third trimester US   Declined NIPT

## 2024-08-20 LAB
BASOPHILS # BLD AUTO: 0.1 THOU/CMM (ref 0–0.1)
BASOPHILS NFR BLD AUTO: 1 %
DIFFERENTIAL METHOD BLD: ABNORMAL
EOSINOPHIL # BLD AUTO: 0.5 THOU/CMM (ref 0–0.5)
EOSINOPHIL NFR BLD AUTO: 7 %
ERYTHROCYTE [DISTWIDTH] IN BLOOD BY AUTOMATED COUNT: 13.4 % (ref 12–16)
HCT VFR BLD AUTO: 29.7 % (ref 35–43)
HGB BLD-MCNC: 9.8 G/DL (ref 11.5–14.5)
LYMPHOCYTES # BLD AUTO: 1.2 THOU/CMM (ref 1–3)
LYMPHOCYTES NFR BLD AUTO: 16 %
MCH RBC QN AUTO: 27.9 PG (ref 26–34)
MCHC RBC AUTO-ENTMCNC: 33 G/DL (ref 32–37)
MCV RBC AUTO: 85 FL (ref 80–100)
MONOCYTES # BLD AUTO: 0.3 THOU/CMM (ref 0.3–1)
MONOCYTES NFR BLD AUTO: 5 %
NEUTROPHILS # BLD AUTO: 5.4 THOU/CMM (ref 1.8–7.8)
NEUTROPHILS NFR BLD AUTO: 71 %
PLATELET # BLD AUTO: 250 THOU/CMM (ref 140–350)
PMV BLD REES-ECKER: 10.1 FL (ref 7.5–11.3)
RBC # BLD AUTO: 3.51 MILL/CMM (ref 3.7–4.7)
T PALLIDUM AB SER-ACNC: NONREACTIVE
WBC # BLD AUTO: 7.6 THOU/CMM (ref 4–10)

## 2024-08-20 NOTE — ASSESSMENT & PLAN NOTE
Rema Esposito is a 33 y.o.   32w1d who presents for routine PNV.    Completed 2 weeks of fingersticks at home has yet to submit values.  Encourage patient to send in as soon as possible to determine gestational diabetic status  28 week labs reviewed: anemic 9.8 at 32 weeks, bariatric patient, will need Iron infusions, Nurse navigator left a phone message and will [place] back with her   TWG 6.804 kg (15 lb)   Good fetal movement. Denies contractions, cramping, leakage of fluid or vaginal bleeding.   Mentions SOB more so with walking, resolves with rest   Tdap vaccine completed  Reviewed  labor precautions and FKCs.   Advised to start Perineal massage at 34-36 weeks   Pregnancy Essential guide and Baby and Me web site recommended

## 2024-08-20 NOTE — PATIENT INSTRUCTIONS
Patient Education     Pregnancy - The Eighth Month   About this topic   It is important for you to learn how to take care of yourself to help you have a healthy baby and safe delivery. It is good to have health care throughout your pregnancy.  The eighth month of your pregnancy starts around week 33 and lasts through week 36. By knowing how far along you are, you can learn what is normal for this stage of your pregnancy and plan for what is next.  General   Your body   During the eighth month of your pregnancy, here are some things you can expect.  You may:  Be feeling more tired. Rest as much as you can and take small naps if possible. This also helps with swollen feet and ankles.  Feel hot all the time. Be sure to drink plenty of water.  Notice your baby drops more into your pelvis. This makes breathing a bit easier, but you may have to go to the bathroom more often. You may also notice more problems with hemorrhoids.  Have more back pain  Notice clear jelly-like substance flecked with blood when you use the restroom. This is your mucus plug.  Feel less steady on your feet. This is because your hips and joints are preparing for birth.  Be tested for Group Beta Strep (GBS) to see if you will need antibiotics during labor  Gain about 1/2 to 1 pound (.23 to .45 kg) a week for the rest of your pregnancy. It is normal to gain about 5 to 10 pounds (2.3 to 4.5 kg) total in your first 4 months.  Have a BPP, or Biophysical Profile. The doctors do an ultrasound to check your baby’s health if you are at high risk or having problems.  Have a NST, or Non Stress Test. The staff place special monitors around your belly to check the baby’s heart rate and look for contractions.  Your baby's growth and development:  Your baby is almost fully mature but will spend the rest of the time inside of you getting bigger.  Their lungs are the last organ to mature, so it is important that your baby stays in your womb until your due date if  possible.  Their bones are getting stronger each day. The bones in their skull stay a little softer to make delivery easier.  They are able to scratch themselves as their fingernails are developed.  Your baby is becoming protected from germs as they develop antibodies.  They are moving a little less because there is less room.  Your baby is about 19 inches (48 cm) long and weighs about 6 pounds (2,700 gm). Your baby is about the size of a papaya or pineapple.  Things to Think About   Avoid alcohol, drugs, tobacco products, and second hand smoke.  Be sure you know the signs of labor and when to call your doctor.  Are you planning a natural childbirth or thinking about an epidural? Know things you can do to help cope with labor pain.  You may want to learn about cord blood banking.  Do you have everything you need for after the baby is born? Be sure the car seat fits in the car.  When do I need to call the doctor?   Contractions every 10 minutes or more often that do not go away with drinking water or position changes  Headache that does not go away; blurry vision; seeing spots or halos; increase in swelling in your hands, feet, or face; and pain under your ribs on the right side  Low, dull back pain that does not go away  Pressure in your pelvis that feels like your baby is pushing down  A gush or constant trickle of watery or bloody fluid leaking from your vagina  Little to no movement felt by baby in 2 hours. Your baby should be moving at least 10 times every 2 hours.  Vaginal bleeding with or without pain  Fever of 100.4°F (38°C) or higher  After a car accident, fall, or any trauma to your belly  Having thoughts of harming yourself or others, or do not feel safe at home  Last Reviewed Date   2020-05-06  Consumer Information Use and Disclaimer   This generalized information is a limited summary of diagnosis, treatment, and/or medication information. It is not meant to be comprehensive and should be used as a tool  "to help the user understand and/or assess potential diagnostic and treatment options. It does NOT include all information about conditions, treatments, medications, side effects, or risks that may apply to a specific patient. It is not intended to be medical advice or a substitute for the medical advice, diagnosis, or treatment of a health care provider based on the health care provider's examination and assessment of a patient’s specific and unique circumstances. Patients must speak with a health care provider for complete information about their health, medical questions, and treatment options, including any risks or benefits regarding use of medications. This information does not endorse any treatments or medications as safe, effective, or approved for treating a specific patient. UpToDate, Inc. and its affiliates disclaim any warranty or liability relating to this information or the use thereof. The use of this information is governed by the Terms of Use, available at https://www.Wis.dm.[employer]/en/know/clinical-effectiveness-terms   Copyright   Copyright © 2024 UpToDate, Inc. and its affiliates and/or licensors. All rights reserved.  Patient Education     Your baby's movement before birth   The Basics   Written by the doctors and editors at [employer]   When should I start feeling my baby move? -- It depends. Most people first feel their baby moving in the uterus between about 16 and 20 weeks of pregnancy. It might take longer to feel movement if this is your first pregnancy or if the placenta is in the front of your uterus.  What kinds of movements should I feel? -- When you first feel your baby move, it might feel like a gentle flutter in your belly. This is sometimes called \"quickening.\" As the baby grows, their movements will get stronger. You will probably feel them kicking, rolling, and stretching. Later in pregnancy, you might be able to see and feel the baby moving from the outside.  You might notice " "that your baby is more active at certain times of the day or night. Even before birth, babies have periods of being asleep and awake. When your baby is sleeping, you might notice that they do not move as much.  Should I keep track of my baby's movements? -- If your pregnancy is healthy, you probably do not need to count or record your baby's movements. Feeling regular movement is a good sign that the baby is doing well.  In some cases, your doctor or midwife might ask you to keep track of your baby's movements. If so, they will tell you how to do this and when to call them.  A change in your baby's movements does not always mean that there is a problem. But in some cases, it can be a sign that the baby is having trouble. If your doctor or midwife is concerned, they can do tests to check on the baby.  If I am asked to track movement, how should I do it? -- There are different ways of tracking your baby's movement. This is sometimes called \"kick counting.\"  Your doctor or midwife will tell you exactly what to track. For example, they might ask you to write down:   How long it takes to feel 10 kicks or movements   How many times your baby moves in 1 hour  Many experts consider at least 10 movements in 2 hours to be a sign that the baby is doing well. But there is no specific cutoff for exactly how much movement is healthy or unhealthy. Some babies are more active than others, and some pregnant people feel movement more easily than others. The main goal of kick counting is to get to know your baby's normal patterns so you can tell if anything changes.  If you are doing kick counting:   Choose a time of day when your baby is usually active.   Find a quiet place where you will not be distracted.   Lie down on your side in a comfortable position.   Check the clock, or set a timer.   Each time you feel your baby move or kick, write down the time. Some people use a smartphone johny to keep track.   If your baby seems less " active than usual, try moving around, eating a snack, and emptying your bladder. This can help wake the baby up if they are asleep.   Stop counting after you have felt 10 kicks, or after the length of time your doctor or midwife told you.  When should I call the doctor? -- Call your doctor or midwife for advice if:   You have concerns about your baby's movement.   Your baby is moving less than they normally do.   You notice a sudden change in the pattern of your baby's movements.   You have any other symptoms that worry you.  All topics are updated as new evidence becomes available and our peer review process is complete.  This topic retrieved from [employer] on: Feb 26, 2024.  Topic 385431 Version 1.0  Release: 32.2.4 - C32.56  © 2024 UpToDate, Inc. and/or its affiliates. All rights reserved.  Consumer Information Use and Disclaimer   Disclaimer: This generalized information is a limited summary of diagnosis, treatment, and/or medication information. It is not meant to be comprehensive and should be used as a tool to help the user understand and/or assess potential diagnostic and treatment options. It does NOT include all information about conditions, treatments, medications, side effects, or risks that may apply to a specific patient. It is not intended to be medical advice or a substitute for the medical advice, diagnosis, or treatment of a health care provider based on the health care provider's examination and assessment of a patient's specific and unique circumstances. Patients must speak with a health care provider for complete information about their health, medical questions, and treatment options, including any risks or benefits regarding use of medications. This information does not endorse any treatments or medications as safe, effective, or approved for treating a specific patient. UpToDate, Inc. and its affiliates disclaim any warranty or liability relating to this information or the use thereof.The use  of this information is governed by the Terms of Use, available at https://www.[employer].com/en/know/clinical-effectiveness-terms. 2024© UpToDate, Inc. and its affiliates and/or licensors. All rights reserved.  Copyright   © 2024 UpToDate, Inc. and/or its affiliates. All rights reserved.      Perineal Massage    Perineal massage is recommended starting after 34 weeks in order to reduce risks of perineal tearing during childbirth.  You have been provided and instructional sheet in your yellow 28 week prenatal packet.

## 2024-08-20 NOTE — PROGRESS NOTES
32w1d  Pap .2024 Negative HPV Negative   GC/CT:4/10/2024  PN1 Labs: 2024  Blood Type: B  Positive   MFM Level 1:2024  MFM Level 2:2024  AFP:   28 Week Labs:2024 No glucose test   TDap:2024  Flu:  GBS:   Blue Folder: given   Yellow Folder:  Ped Referral :  Breast pump:  L&D forms:  Delivery consent:     Patient reports positive fetal movements with no concerns at this time .

## 2024-08-21 ENCOUNTER — TELEPHONE (OUTPATIENT)
Dept: OBGYN CLINIC | Facility: CLINIC | Age: 34
End: 2024-08-21

## 2024-08-21 DIAGNOSIS — O99.019 IRON DEFICIENCY ANEMIA DURING PREGNANCY: Primary | ICD-10-CM

## 2024-08-21 DIAGNOSIS — D50.9 IRON DEFICIENCY ANEMIA DURING PREGNANCY: Primary | ICD-10-CM

## 2024-08-21 NOTE — TELEPHONE ENCOUNTER
----- Message from Ira Armenta PA-C sent at 8/21/2024  8:27 AM EDT -----  Please call this patient- her hemoglobin is low at 9.8 and she has a hx of bariatric surgery. She is going to need iron infusions but I would like her to complete iron studies first- I am not sure why an anemia reflex was not performed. I am going to place an order for iron panel now. Please have her complete this blood work this week so we can start scheduling venofer

## 2024-08-22 ENCOUNTER — ROUTINE PRENATAL (OUTPATIENT)
Dept: OBGYN CLINIC | Facility: CLINIC | Age: 34
End: 2024-08-22
Payer: COMMERCIAL

## 2024-08-22 VITALS
HEIGHT: 64 IN | BODY MASS INDEX: 35 KG/M2 | SYSTOLIC BLOOD PRESSURE: 110 MMHG | WEIGHT: 205 LBS | DIASTOLIC BLOOD PRESSURE: 70 MMHG

## 2024-08-22 DIAGNOSIS — D50.9 IRON DEFICIENCY ANEMIA DURING PREGNANCY: ICD-10-CM

## 2024-08-22 DIAGNOSIS — Z3A.32 32 WEEKS GESTATION OF PREGNANCY: Primary | ICD-10-CM

## 2024-08-22 DIAGNOSIS — O99.019 IRON DEFICIENCY ANEMIA DURING PREGNANCY: ICD-10-CM

## 2024-08-22 LAB
SL AMB  POCT GLUCOSE, UA: ABNORMAL
SL AMB POCT URINE PROTEIN: ABNORMAL

## 2024-08-22 PROCEDURE — 81002 URINALYSIS NONAUTO W/O SCOPE: CPT | Performed by: OBSTETRICS & GYNECOLOGY

## 2024-08-22 PROCEDURE — PNV: Performed by: OBSTETRICS & GYNECOLOGY

## 2024-08-22 NOTE — PROGRESS NOTES
Problem   Iron Deficiency Anemia During Pregnancy   32 Weeks Gestation of Pregnancy    First OB labs - reminded to complete  Gc/chlamydia - neg   Pap - up to date 2022 neg/neg  Blue folder - given    Genetic screening - declined   AFP - ordered   NT scan - declined  Level II - needs to schedule     28 week labs -  COVID vaccine -   TDAP -   Delivery consent -   Yellow folder -     Breast pump -   Pediatrician -   Perineal massage -   GBS -          32 weeks gestation of pregnancy  Rema Esposito is a 33 y.o.   32w1d who presents for routine PNV.    Completed 2 weeks of fingersticks at home has yet to submit values.  Encourage patient to send in as soon as possible to determine gestational diabetic status  28 week labs reviewed: anemic 9.8 at 32 weeks, bariatric patient, will need Iron infusions, Nurse navigator left a phone message and will [place] back with her   TWG 6.804 kg (15 lb)   Good fetal movement. Denies contractions, cramping, leakage of fluid or vaginal bleeding.   Mentions SOB more so with walking, resolves with rest   Tdap vaccine completed  Reviewed  labor precautions and FKCs.   Advised to start Perineal massage at 34-36 weeks   Pregnancy Essential guide and Baby and Me web site recommended       Iron deficiency anemia during pregnancy  Needs iron infusions , HGB 9.8  Nurse navigator to set up with pt

## 2024-08-26 NOTE — TELEPHONE ENCOUNTER
Patient states her insurance does not cover iron infusions- wanted codes for testing to confirm- prior auth is obtained before infusions are scheduled    Patient aware to go for labs will go tomorrow morning.

## 2024-09-04 ENCOUNTER — ROUTINE PRENATAL (OUTPATIENT)
Dept: OBGYN CLINIC | Facility: CLINIC | Age: 34
End: 2024-09-04
Payer: COMMERCIAL

## 2024-09-04 VITALS
WEIGHT: 211.6 LBS | SYSTOLIC BLOOD PRESSURE: 110 MMHG | HEART RATE: 86 BPM | DIASTOLIC BLOOD PRESSURE: 64 MMHG | OXYGEN SATURATION: 96 % | BODY MASS INDEX: 36.32 KG/M2

## 2024-09-04 DIAGNOSIS — O99.019 IRON DEFICIENCY ANEMIA DURING PREGNANCY: ICD-10-CM

## 2024-09-04 DIAGNOSIS — Z3A.34 34 WEEKS GESTATION OF PREGNANCY: ICD-10-CM

## 2024-09-04 DIAGNOSIS — D50.9 IRON DEFICIENCY ANEMIA DURING PREGNANCY: ICD-10-CM

## 2024-09-04 DIAGNOSIS — Z34.83 PRENATAL CARE, SUBSEQUENT PREGNANCY, THIRD TRIMESTER: Primary | ICD-10-CM

## 2024-09-04 LAB
SL AMB  POCT GLUCOSE, UA: NORMAL
SL AMB POCT URINE PROTEIN: NORMAL

## 2024-09-04 PROCEDURE — PNV: Performed by: STUDENT IN AN ORGANIZED HEALTH CARE EDUCATION/TRAINING PROGRAM

## 2024-09-04 PROCEDURE — 81002 URINALYSIS NONAUTO W/O SCOPE: CPT | Performed by: STUDENT IN AN ORGANIZED HEALTH CARE EDUCATION/TRAINING PROGRAM

## 2024-09-04 NOTE — PROGRESS NOTES
34 weeks gestation of pregnancy  32yo  at 34.0wks presents for routine prenatal visit     Pt denies contractions, vaginal bleeding, leakage of fluid. Endorses fetal movement.    -continue PNVs   -s/p tdap   -1 week glucose testing brought to office today, pt passed her GDM screening   - labor precautions provided  -return in 2 weeks     Iron deficiency anemia during pregnancy  -encouraged to complete iron studies, will schedule for iron infusions once resulted

## 2024-09-04 NOTE — ASSESSMENT & PLAN NOTE
32yo  at 34.0wks presents for routine prenatal visit     Pt denies contractions, vaginal bleeding, leakage of fluid. Endorses fetal movement.    -continue PNVs   -s/p tdap   -1 week glucose testing brought to office today, pt passed her GDM screening   - labor precautions provided  -return in 2 weeks

## 2024-09-24 ENCOUNTER — ROUTINE PRENATAL (OUTPATIENT)
Dept: OBGYN CLINIC | Facility: CLINIC | Age: 34
End: 2024-09-24
Payer: COMMERCIAL

## 2024-09-24 VITALS
SYSTOLIC BLOOD PRESSURE: 118 MMHG | BODY MASS INDEX: 36.01 KG/M2 | OXYGEN SATURATION: 96 % | DIASTOLIC BLOOD PRESSURE: 74 MMHG | HEART RATE: 94 BPM | WEIGHT: 209.8 LBS

## 2024-09-24 DIAGNOSIS — O99.019 IRON DEFICIENCY ANEMIA DURING PREGNANCY: ICD-10-CM

## 2024-09-24 DIAGNOSIS — Z3A.37 37 WEEKS GESTATION OF PREGNANCY: ICD-10-CM

## 2024-09-24 DIAGNOSIS — Z34.83 PRENATAL CARE, SUBSEQUENT PREGNANCY, THIRD TRIMESTER: Primary | ICD-10-CM

## 2024-09-24 DIAGNOSIS — D50.9 IRON DEFICIENCY ANEMIA DURING PREGNANCY: ICD-10-CM

## 2024-09-24 LAB
SL AMB  POCT GLUCOSE, UA: NORMAL
SL AMB POCT URINE PROTEIN: NORMAL

## 2024-09-24 PROCEDURE — PNV: Performed by: STUDENT IN AN ORGANIZED HEALTH CARE EDUCATION/TRAINING PROGRAM

## 2024-09-24 PROCEDURE — 81002 URINALYSIS NONAUTO W/O SCOPE: CPT | Performed by: STUDENT IN AN ORGANIZED HEALTH CARE EDUCATION/TRAINING PROGRAM

## 2024-09-24 NOTE — ASSESSMENT & PLAN NOTE
-pt has not yet gone for iron studies. Patient does not want to perform iron infusions at this time. Would like to only take oral iron. Encouraged that she should still perform iron studies and CBC.  -pt today reports fatigue and sometimes shortness of breath. Currently asymptomatic. Discussed these symptoms may be related to anemia. Precautions on when to present to ED provided.

## 2024-09-24 NOTE — PROGRESS NOTES
37 weeks gestation of pregnancy  33yo  at 36.6wks presents for routine prenatal visit     Pt denies contractions, vaginal bleeding, leakage of fluid. Endorses fetal movement.    -continue PNVs   -s/p tdap   -Gbs collected today   -discussed elective IOL vs spontaneous labor. Would like to go into labor naturally at this time. Understands need for 41 week IOL if necessary.   -labor precautions provided  -return in 1 week     Iron deficiency anemia during pregnancy  -pt has not yet gone for iron studies. Patient does not want to perform iron infusions at this time. Would like to only take oral iron. Encouraged that she should still perform iron studies and CBC.  -pt today reports fatigue and sometimes shortness of breath. Currently asymptomatic. Discussed these symptoms may be related to anemia. Precautions on when to present to ED provided.

## 2024-09-24 NOTE — ASSESSMENT & PLAN NOTE
35yo  at 36.6wks presents for routine prenatal visit     Pt denies contractions, vaginal bleeding, leakage of fluid. Endorses fetal movement.    -continue PNVs   -s/p tdap   -Gbs collected today   -discussed elective IOL vs spontaneous labor. Would like to go into labor naturally at this time. Understands need for 41 week IOL if necessary.   -labor precautions provided  -return in 1 week

## 2024-09-26 LAB — GP B STREP DNA SPEC QL NAA+PROBE: NEGATIVE

## 2024-09-30 ENCOUNTER — TELEPHONE (OUTPATIENT)
Dept: OBGYN CLINIC | Facility: CLINIC | Age: 34
End: 2024-09-30

## 2024-09-30 NOTE — TELEPHONE ENCOUNTER
----- Message from Meera BARBA sent at 3/28/2024  8:29 AM EDT -----  Regarding: 3rd trimester call  8/7-9/4//24    [facility]    Overall how are you feeling? Feeling fine.     Compliant with routine OB appointments? yes    Have you completed your 3rd trimester lab work? yes    Have you reviewed the contents of 3rd trimester folder from office?    Have you decided on a pediatrician? yes    If yes, who [facility] ( Not affiliated with [facility])    If no, what are you looking for and request sent for outreach.   Questions on paperwork to go back to office? No, but no birthing plan was in yellow folder.  - will give at next appt. 10/10   Questions on the baby birth certificate forms? no    EPDS [place] [facility]    Send link for the Hospital Readiness Video via [employer]     Pt has agreed to one iron infusion prior to delivery.  Order placed in [employer].  Order signed by Dr Wilkins.  Called [facility].  Appt scheduled for  Infusion center, Friday, 10/11 2PM.  Called pt to inform of date, time & location. Address given.  Pt verbalized understanding. Advised pt to call with any further questions/concerns.

## 2024-10-02 ENCOUNTER — APPOINTMENT (OUTPATIENT)
Dept: LAB | Facility: CLINIC | Age: 34
End: 2024-10-02
Payer: COMMERCIAL

## 2024-10-02 DIAGNOSIS — D50.9 IRON DEFICIENCY ANEMIA DURING PREGNANCY: ICD-10-CM

## 2024-10-02 DIAGNOSIS — O99.019 IRON DEFICIENCY ANEMIA DURING PREGNANCY: ICD-10-CM

## 2024-10-02 DIAGNOSIS — Z34.82 PRENATAL CARE, SUBSEQUENT PREGNANCY, SECOND TRIMESTER: ICD-10-CM

## 2024-10-02 LAB
FERRITIN SERPL-MCNC: 7 NG/ML (ref 11–307)
IRON SATN MFR SERPL: 3 % (ref 15–50)
IRON SERPL-MCNC: 26 UG/DL (ref 50–212)
TIBC SERPL-MCNC: 978 UG/DL (ref 250–450)
UIBC SERPL-MCNC: 952 UG/DL (ref 155–355)

## 2024-10-02 PROCEDURE — 82728 ASSAY OF FERRITIN: CPT

## 2024-10-02 PROCEDURE — 36415 COLL VENOUS BLD VENIPUNCTURE: CPT

## 2024-10-02 PROCEDURE — 83540 ASSAY OF IRON: CPT

## 2024-10-02 PROCEDURE — 83550 IRON BINDING TEST: CPT

## 2024-10-03 ENCOUNTER — ROUTINE PRENATAL (OUTPATIENT)
Dept: OBGYN CLINIC | Facility: CLINIC | Age: 34
End: 2024-10-03
Payer: COMMERCIAL

## 2024-10-03 ENCOUNTER — APPOINTMENT (OUTPATIENT)
Dept: LAB | Facility: CLINIC | Age: 34
End: 2024-10-03
Payer: COMMERCIAL

## 2024-10-03 VITALS
BODY MASS INDEX: 36.73 KG/M2 | SYSTOLIC BLOOD PRESSURE: 106 MMHG | DIASTOLIC BLOOD PRESSURE: 78 MMHG | WEIGHT: 214 LBS | HEART RATE: 70 BPM | OXYGEN SATURATION: 99 %

## 2024-10-03 DIAGNOSIS — D50.9 IRON DEFICIENCY ANEMIA DURING PREGNANCY: ICD-10-CM

## 2024-10-03 DIAGNOSIS — D50.9 IRON DEFICIENCY ANEMIA DURING PREGNANCY: Primary | ICD-10-CM

## 2024-10-03 DIAGNOSIS — O99.013 ANEMIA DURING PREGNANCY IN THIRD TRIMESTER: ICD-10-CM

## 2024-10-03 DIAGNOSIS — Z3A.38 38 WEEKS GESTATION OF PREGNANCY: ICD-10-CM

## 2024-10-03 DIAGNOSIS — O99.019 IRON DEFICIENCY ANEMIA DURING PREGNANCY: Primary | ICD-10-CM

## 2024-10-03 DIAGNOSIS — Z34.83 PRENATAL CARE, SUBSEQUENT PREGNANCY IN THIRD TRIMESTER: Primary | ICD-10-CM

## 2024-10-03 DIAGNOSIS — O99.019 IRON DEFICIENCY ANEMIA DURING PREGNANCY: ICD-10-CM

## 2024-10-03 LAB
BASOPHILS # BLD AUTO: 0.03 THOUSANDS/ΜL (ref 0–0.1)
BASOPHILS NFR BLD AUTO: 0 % (ref 0–1)
EOSINOPHIL # BLD AUTO: 0.29 THOUSAND/ΜL (ref 0–0.61)
EOSINOPHIL NFR BLD AUTO: 4 % (ref 0–6)
ERYTHROCYTE [DISTWIDTH] IN BLOOD BY AUTOMATED COUNT: 14.1 % (ref 11.6–15.1)
HCT VFR BLD AUTO: 29 % (ref 34.8–46.1)
HGB BLD-MCNC: 8.8 G/DL (ref 11.5–15.4)
IMM GRANULOCYTES # BLD AUTO: 0.04 THOUSAND/UL (ref 0–0.2)
IMM GRANULOCYTES NFR BLD AUTO: 1 % (ref 0–2)
LYMPHOCYTES # BLD AUTO: 1.61 THOUSANDS/ΜL (ref 0.6–4.47)
LYMPHOCYTES NFR BLD AUTO: 23 % (ref 14–44)
MCH RBC QN AUTO: 26.2 PG (ref 26.8–34.3)
MCHC RBC AUTO-ENTMCNC: 30.3 G/DL (ref 31.4–37.4)
MCV RBC AUTO: 86 FL (ref 82–98)
MONOCYTES # BLD AUTO: 0.38 THOUSAND/ΜL (ref 0.17–1.22)
MONOCYTES NFR BLD AUTO: 6 % (ref 4–12)
NEUTROPHILS # BLD AUTO: 4.52 THOUSANDS/ΜL (ref 1.85–7.62)
NEUTS SEG NFR BLD AUTO: 66 % (ref 43–75)
NRBC BLD AUTO-RTO: 0 /100 WBCS
PLATELET # BLD AUTO: 254 THOUSANDS/UL (ref 149–390)
PMV BLD AUTO: 12.3 FL (ref 8.9–12.7)
RBC # BLD AUTO: 3.36 MILLION/UL (ref 3.81–5.12)
SL AMB  POCT GLUCOSE, UA: NORMAL
SL AMB POCT URINE PROTEIN: NORMAL
WBC # BLD AUTO: 6.87 THOUSAND/UL (ref 4.31–10.16)

## 2024-10-03 PROCEDURE — PNV: Performed by: OBSTETRICS & GYNECOLOGY

## 2024-10-03 PROCEDURE — 36415 COLL VENOUS BLD VENIPUNCTURE: CPT

## 2024-10-03 PROCEDURE — 85025 COMPLETE CBC W/AUTO DIFF WBC: CPT

## 2024-10-03 PROCEDURE — 81002 URINALYSIS NONAUTO W/O SCOPE: CPT | Performed by: OBSTETRICS & GYNECOLOGY

## 2024-10-03 RX ORDER — SODIUM CHLORIDE 9 MG/ML
20 INJECTION, SOLUTION INTRAVENOUS ONCE
Status: CANCELLED | OUTPATIENT
Start: 2024-10-07

## 2024-10-03 RX ORDER — DIPHENHYDRAMINE HCL 25 MG
25 TABLET ORAL EVERY 6 HOURS PRN
Status: ON HOLD | COMMUNITY

## 2024-10-03 NOTE — PROGRESS NOTES
Prenatal visit  GA 38w 0d  Denies any vaginal bleeding, Leaking of fluid or pelvic cramping   Normal Fetal movement.  Desires cervical check today and would like to discuss induction.   28 wk labs completed 08/20/2024 hemoglobin was low. Patient completed iron panel yesterday.   S/p Tdap vaccine 08/02/2024  Delivery consent signed on 08/02/2024  Has a breast pump.   Has pediatrician   GBS neg  Patient reports that she may have an umbilical hernia due to pain right above her belly button on Sunday until Tuesday.

## 2024-10-03 NOTE — PATIENT INSTRUCTIONS
Patient Education     Pregnancy - The Ninth Month   About this topic   It is important for you to learn how to take care of yourself to help you have a healthy baby and safe delivery. It is good to have health care throughout your pregnancy.  The ninth month of your pregnancy starts around week 37 and lasts through delivery. By knowing how far along you are, you can learn what is normal for this stage of your pregnancy and plan for what is next.  General   Your body   During the ninth month of your pregnancy, here are some things you can expect.  You may:  Lose your mucous plug as your cervix starts to get thinner and dilate.  Notice a small amount of streaky red or pink spotting.  Your doctor may discuss stripping your membranes to help the labor progress.  Go into labor any time. Most women deliver their baby between 38 and 42 weeks.  Notice your belly button sticks out. It should go back to normal after you give birth.  Have a bit of extra energy as you get ready for your baby  Notice your breasts are leaking more. This is normal as your body is making the first milk you can feed your baby.  Have tests to check on how your baby is doing. Your doctor will most likely not let you be pregnant for more than 42 weeks.  Not gain any weight this month. Some mothers even lose 1 to 2 pounds (.45 to .9 kg).  Your baby's growth and development:  Your baby has been busy swallowing fluid and building up waste products for their first bowel movement.  Your baby may start sucking their thumb.  They may come out with dry skin, bruises, or a misshapen head. Living in a watery fluid and going through labor is tough on your baby too. These are all normal and will change in the first weeks of life.  Your baby may have lots of hair on their head or not very much at all. Long fingernails are normal.  Your baby is about 20 inches (51 cm) long and weighs about 7 1/2 pounds (3,400 gm). Your baby is about the size of a watermelon.  Things  to Think About   Avoid alcohol, drugs, tobacco products, and second hand smoke.  Talk to your doctor if you plan to travel or get on a plane.  Have your bag packed so you are ready for delivery.  Are you planning a natural childbirth or thinking about an epidural? Know things you can do to help cope with labor pain.  If you are having a boy, decide if you want to have him circumcised.  Be sure the car seat is installed the right way so you are ready to bring your baby home.  When do I need to call the doctor?   Contractions every 5 minutes or more often that do not go away with rest, drinking water, or position changes  Headache that does not go away; blurry vision; seeing spots or halos; increase in swelling in your hands, feet, or face; and pain under your ribs on the right side  Low, dull back pain that does not go away  Pressure in your pelvis that feels like your baby is pushing down  A gush or constant trickle of watery or bloody fluid leaking from your vagina  Little to no movement felt by baby in 2 hours. Your baby should be moving at least 10 times every 2 hours.  Vaginal bleeding with or without pain  Fever of 100.4°F (38°C) or higher  After a car accident, fall, or any trauma to your belly  Having thoughts of harming yourself or others, or do not feel safe at home  Last Reviewed Date   2020-05-06  Consumer Information Use and Disclaimer   This generalized information is a limited summary of diagnosis, treatment, and/or medication information. It is not meant to be comprehensive and should be used as a tool to help the user understand and/or assess potential diagnostic and treatment options. It does NOT include all information about conditions, treatments, medications, side effects, or risks that may apply to a specific patient. It is not intended to be medical advice or a substitute for the medical advice, diagnosis, or treatment of a health care provider based on the health care provider's examination  "and assessment of a patient’s specific and unique circumstances. Patients must speak with a health care provider for complete information about their health, medical questions, and treatment options, including any risks or benefits regarding use of medications. This information does not endorse any treatments or medications as safe, effective, or approved for treating a specific patient. UpToDate, Inc. and its affiliates disclaim any warranty or liability relating to this information or the use thereof. The use of this information is governed by the Terms of Use, available at https://www.[employer].com/en/know/clinical-effectiveness-terms   Copyright   Copyright © 2024 UpToDate, Inc. and its affiliates and/or licensors. All rights reserved.  Patient Education     Your baby's movement before birth   The Basics   Written by the doctors and editors at [facility]   When should I start feeling my baby move? -- It depends. Most people first feel their baby moving in the uterus between about 16 and 20 weeks of pregnancy. It might take longer to feel movement if this is your first pregnancy or if the placenta is in the front of your uterus.  What kinds of movements should I feel? -- When you first feel your baby move, it might feel like a gentle flutter in your belly. This is sometimes called \"quickening.\" As the baby grows, their movements will get stronger. You will probably feel them kicking, rolling, and stretching. Later in pregnancy, you might be able to see and feel the baby moving from the outside.  You might notice that your baby is more active at certain times of the day or night. Even before birth, babies have periods of being asleep and awake. When your baby is sleeping, you might notice that they do not move as much.  Should I keep track of my baby's movements? -- If your pregnancy is healthy, you probably do not need to count or record your baby's movements. Feeling regular movement is a good sign that the " "baby is doing well.  In some cases, your doctor or midwife might ask you to keep track of your baby's movements. If so, they will tell you how to do this and when to call them.  A change in your baby's movements does not always mean that there is a problem. But in some cases, it can be a sign that the baby is having trouble. If your doctor or midwife is concerned, they can do tests to check on the baby.  If I am asked to track movement, how should I do it? -- There are different ways of tracking your baby's movement. This is sometimes called \"kick counting.\"  Your doctor or midwife will tell you exactly what to track. For example, they might ask you to write down:   How long it takes to feel 10 kicks or movements   How many times your baby moves in 1 hour  Many experts consider at least 10 movements in 2 hours to be a sign that the baby is doing well. But there is no specific cutoff for exactly how much movement is healthy or unhealthy. Some babies are more active than others, and some pregnant people feel movement more easily than others. The main goal of kick counting is to get to know your baby's normal patterns so you can tell if anything changes.  If you are doing kick counting:   Choose a time of day when your baby is usually active.   Find a quiet place where you will not be distracted.   Lie down on your side in a comfortable position.   Check the clock, or set a timer.   Each time you feel your baby move or kick, write down the time. Some people use a smartphone johny to keep track.   If your baby seems less active than usual, try moving around, eating a snack, and emptying your bladder. This can help wake the baby up if they are asleep.   Stop counting after you have felt 10 kicks, or after the length of time your doctor or midwife told you.  When should I call the doctor? -- Call your doctor or midwife for advice if:   You have concerns about your baby's movement.   Your baby is moving less than they " normally do.   You notice a sudden change in the pattern of your baby's movements.   You have any other symptoms that worry you.  All topics are updated as new evidence becomes available and our peer review process is complete.  This topic retrieved from [employer] on: Feb 26, 2024.  Topic 076955 Version 1.0  Release: 32.2.4 - C32.56  © 2024 UpToDate, Inc. and/or its affiliates. All rights reserved.  Consumer Information Use and Disclaimer   Disclaimer: This generalized information is a limited summary of diagnosis, treatment, and/or medication information. It is not meant to be comprehensive and should be used as a tool to help the user understand and/or assess potential diagnostic and treatment options. It does NOT include all information about conditions, treatments, medications, side effects, or risks that may apply to a specific patient. It is not intended to be medical advice or a substitute for the medical advice, diagnosis, or treatment of a health care provider based on the health care provider's examination and assessment of a patient's specific and unique circumstances. Patients must speak with a health care provider for complete information about their health, medical questions, and treatment options, including any risks or benefits regarding use of medications. This information does not endorse any treatments or medications as safe, effective, or approved for treating a specific patient. UpToDate, Inc. and its affiliates disclaim any warranty or liability relating to this information or the use thereof.The use of this information is governed by the Terms of Use, available at https://www.[employer].com/en/know/clinical-effectiveness-terms. 2024© UpToDate, Inc. and its affiliates and/or licensors. All rights reserved.  Copyright   © 2024 UpToDate, Inc. and/or its affiliates. All rights reserved.      Perineal Massage    Perineal massage is recommended starting after 34 weeks in order to reduce risks of  "perineal tearing during childbirth.  You have been provided and instructional sheet in your yellow 28 week prenatal packet.       Patient Education     How to tell when labor starts   The Basics   Written by the doctors and editors at [facility]   What is labor? -- When a person is pregnant, labor is the process that leads to birth of the baby. Contractions of the uterus are the main sign of labor.  Labor usually starts on its own between 37 and 42 weeks of pregnancy. Your \"due date\" is at 40 weeks.  A pregnancy that lasts 37 to 42 weeks is called a \"term\" pregnancy. When labor starts before 37 weeks, doctors call it \"\" labor.  What are the signs that labor is starting? -- The signs that labor is starting, or about to start, can include the following:   The baby moves lower (or \"drops\") in your belly.   You have increased vaginal discharge that is thick, mucus-like, or slightly bloody. (\"Vaginal discharge\" is the term doctors use to describe the fluid that comes out of the vagina.) The increased vaginal discharge is sometimes called a \"mucus plug\" or a \"bloody show.\"   Your \"water breaks.\" During pregnancy, your baby is in a sac in your uterus and surrounded by a fluid called \"amniotic fluid.\" This sac typically breaks open sometime before your baby is born. When it breaks open, the fluid inside comes out of your vagina. This can feel like a big gush or just a trickle of fluid.   You have low back pain or belly cramps.   You start having contractions. During a contraction, the uterus tightens. This can be painful and make your belly feel hard. After a contraction, the uterus relaxes and the pain goes away. Some people have \"[place]-Kathleen contractions\" or \"false-labor contractions.\" These feel like contractions, but they are not true contractions. They do not mean that you are in labor.  How can I tell if I'm having true contractions? -- It can be hard to tell if you are having true contractions or [place]-Kathleen " "contractions. But here are some ways to help tell the difference:   True contractions come every few minutes and get more frequent over time. [place]-Kathleen contractions can come every few minutes, but they don't get more frequent over time.   True contractions don't go away, even when you rest. Jhonathan-Kathleen contractions usually go away when you rest.   True contractions get stronger and more painful over time. Jhonathan-Kathleen contractions usually don't get stronger or more painful over time.   True contractions might be felt in your back and front. [place]-Kathleen contractions are usually only in front.  If you are still not sure whether you are having true contractions, call your doctor or midwife.  What should I do if I start having contractions? -- If you start having contractions, time them to see how far apart they are. That way, you can tell if they get more frequent.  You can time your contractions by keeping track of the time when each contraction starts. If you have a clock with a second hand or a timer on your smartphone, you can also time how long each contraction lasts. Your doctor or midwife will want to know how far apart your contractions are and how long they last.  When should I call my doctor or midwife? -- Call your doctor or midwife if you think that you are in labor. You should also call if any of the following things happen:   You have blood, mucus, or fluid leaking from your vagina.   You have 6 or more contractions in 1 hour. (This means that your contractions are 10 minutes apart or less.)   Your contractions are getting stronger and are painful.  Your doctor or midwife will probably want to see you to do an exam. To tell if you are in labor, they will check your cervix to see if it is opening (\"dilated\") and thinning out. They will see how frequent your contractions are. They might also do other tests.  What if my labor starts too soon? -- If you start having any symptoms of labor before 37 " "weeks, call your doctor right away. They might want to give you medicine to try to stop your labor or help the baby if it is born early.  What if my labor doesn't start on its own? -- If your labor doesn't start on its own, your doctor will talk to you about your options. They might try to start your labor with medicines. This is called \"inducing labor.\"  How long will my labor last? -- If it's your first baby, your labor will probably last for many hours. If it's not your first baby, your labor will probably be shorter.  All topics are updated as new evidence becomes available and our peer review process is complete.  This topic retrieved from [employer] on: Feb 26, 2024.  Topic 52716 Version 12.0  Release: 32.2.4 - C32.56  © 2024 UpToDate, Inc. and/or its affiliates. All rights reserved.  Consumer Information Use and Disclaimer   Disclaimer: This generalized information is a limited summary of diagnosis, treatment, and/or medication information. It is not meant to be comprehensive and should be used as a tool to help the user understand and/or assess potential diagnostic and treatment options. It does NOT include all information about conditions, treatments, medications, side effects, or risks that may apply to a specific patient. It is not intended to be medical advice or a substitute for the medical advice, diagnosis, or treatment of a health care provider based on the health care provider's examination and assessment of a patient's specific and unique circumstances. Patients must speak with a health care provider for complete information about their health, medical questions, and treatment options, including any risks or benefits regarding use of medications. This information does not endorse any treatments or medications as safe, effective, or approved for treating a specific patient. UpToDate, Inc. and its affiliates disclaim any warranty or liability relating to this information or the use thereof.The use of " this information is governed by the Terms of Use, available at https://www.w[employer]ters[employer] Technologyuwer.com/en/know/clinical-effectiveness-terms. 2024© [facility] [employer] and its affiliates and/or licensors. All rights reserved.  Copyright   © 2024 [facility] [employer] and/or its affiliates. All rights reserved.

## 2024-10-03 NOTE — ASSESSMENT & PLAN NOTE
Rema Esposito is a 34 y.o.   38w1d who presents for routine PNV.  28 week labs reviewed:  H &H 9.8/29.7 , did complete 1 week of finger sticks, which were normal .pt did not go for iron infusions as recommended and was delayed in getting iron panel studies as recommended  Would like induction, process explained, consent signed, message to OB pool. Will need cervical ripening   TWG 8.981 kg (19 lb 12.8 oz)   Denies OB complaints. Good fetal movement. Denies contractions, cramping, leakage of fluid or vaginal bleeding.   Tdap vaccine completed  Reviewed FKCs.   Advised to continue  Perineal massage   Pregnancy Essential guide and Baby and Me web site recommended

## 2024-10-03 NOTE — PROGRESS NOTES
Problem   Iron Deficiency Anemia During Pregnancy   38 Weeks Gestation of Pregnancy    First OB labs - reminded to complete  Gc/chlamydia - neg   Pap - up to date 2022 neg/neg  Blue folder - given    Genetic screening - declined   AFP - ordered   NT scan - declined  Level II - needs to schedule     28 week labs -H &H 9.8/29.7 , pt was advised to start iron infusions  COVID vaccine -   TDAP - completed   Delivery consent -   Yellow folder -     Breast pump -   Pediatrician -   Perineal massage -   GBS -          38 weeks gestation of pregnancy  Rema Esposito is a 34 y.o.   38w1d who presents for routine PNV.  28 week labs reviewed:  H &H 9.8/29.7 , did complete 1 week of finger sticks, which were normal .pt did not go for iron infusions as recommended and was delayed in getting iron panel studies as recommended  Would like induction, process explained, consent signed, message to OB pool. Will need cervical ripening   TWG 8.981 kg (19 lb 12.8 oz)   Denies OB complaints. Good fetal movement. Denies contractions, cramping, leakage of fluid or vaginal bleeding.   Tdap vaccine completed  Reviewed FKCs.   Advised to continue  Perineal massage   Pregnancy Essential guide and Baby and Me web site recommended       Iron deficiency anemia during pregnancy  Ferritin 7 on 10/3/24  Has not been taking oral iron and has not scheduled for iron infusions,   She sts she was waiting for someone to call her and schedule, however note from Dr Wilkins states she had not completed her blood work and was not willing to have iron infusions, she wanted to take oral iron  Today we discussed the need for infusions based on ferritin level, aware the oral iron will not be effective given s/p gastric bypass and lack of time as she is 38.1 weeks today.  Pt sent for CBC today as this was not ordered with iron panel   Pt is willing to have iron infusions now,   Will try to coordinate

## 2024-10-03 NOTE — ASSESSMENT & PLAN NOTE
Ferritin 7 on 10/3/24  Has not been taking oral iron and has not scheduled for iron infusions,   She sts she was waiting for someone to call her and schedule, however note from Dr Wilkins states she had not completed her blood work and was not willing to have iron infusions, she wanted to take oral iron  Today we discussed the need for infusions based on ferritin level, aware the oral iron will not be effective given s/p gastric bypass and lack of time as she is 38.1 weeks today.  Pt sent for CBC today as this was not ordered with iron panel   Pt is willing to have iron infusions now,   Will try to coordinate

## 2024-10-04 ENCOUNTER — TELEPHONE (OUTPATIENT)
Dept: OBGYN CLINIC | Facility: CLINIC | Age: 34
End: 2024-10-04

## 2024-10-04 NOTE — TELEPHONE ENCOUNTER
----- Message from EDY Cochran sent at 10/3/2024  9:39 AM EDT -----  Procedure to be scheduled: IOL   EMMANUELLE:   Cervical dialtion:0  Indication for delivery:   Requested date (s) of delivery:  39 and onwards   If requested date is unavailable, is there a date by which the pt must be delivered?   Physician preference: none       If IOL, anticipated method: cytotec/kaufman      Also will need to get 1 or 2 iron infusions if possible prior to delivery   Ultimately would like to get all 5 , probably impossible to schedule though

## 2024-10-09 NOTE — TELEPHONE ENCOUNTER
10/15 8PM AG into 10/16 NO    Patient notified of IOL date,time,and location. Advised patient she may eat a light breakfast/dinner prior to going to L&D. In the interim please report any vaginal bleeding,leakage of fluid,decreased fetal movement or contractions.Reviewed fetal kick counts. Advised to keep all upcoming prenatal visits.      LOV 7/2017. May we refill?

## 2024-10-10 ENCOUNTER — TELEPHONE (OUTPATIENT)
Dept: OBGYN CLINIC | Facility: CLINIC | Age: 34
End: 2024-10-10

## 2024-10-10 ENCOUNTER — ROUTINE PRENATAL (OUTPATIENT)
Dept: OBGYN CLINIC | Facility: CLINIC | Age: 34
End: 2024-10-10
Payer: COMMERCIAL

## 2024-10-10 VITALS
HEART RATE: 80 BPM | OXYGEN SATURATION: 99 % | SYSTOLIC BLOOD PRESSURE: 104 MMHG | DIASTOLIC BLOOD PRESSURE: 66 MMHG | BODY MASS INDEX: 36.56 KG/M2 | WEIGHT: 213 LBS

## 2024-10-10 DIAGNOSIS — D50.9 IRON DEFICIENCY ANEMIA DURING PREGNANCY: ICD-10-CM

## 2024-10-10 DIAGNOSIS — Z34.83 PRENATAL CARE, SUBSEQUENT PREGNANCY IN THIRD TRIMESTER: Primary | ICD-10-CM

## 2024-10-10 DIAGNOSIS — Z3A.39 39 WEEKS GESTATION OF PREGNANCY: ICD-10-CM

## 2024-10-10 DIAGNOSIS — O99.019 IRON DEFICIENCY ANEMIA DURING PREGNANCY: ICD-10-CM

## 2024-10-10 LAB
SL AMB  POCT GLUCOSE, UA: NORMAL
SL AMB POCT URINE PROTEIN: NORMAL

## 2024-10-10 PROCEDURE — PNV: Performed by: STUDENT IN AN ORGANIZED HEALTH CARE EDUCATION/TRAINING PROGRAM

## 2024-10-10 PROCEDURE — 81002 URINALYSIS NONAUTO W/O SCOPE: CPT | Performed by: STUDENT IN AN ORGANIZED HEALTH CARE EDUCATION/TRAINING PROGRAM

## 2024-10-10 NOTE — TELEPHONE ENCOUNTER
----- Message from Mallory Wilkins DO sent at 10/10/2024 11:57 AM EDT -----  Regarding: IOL for AM, please cancel PM  Hey, can we please cancel the nighttime portion of this induction. Pt should report only in the morning as she is 2/50/-3. Thanks!    Called L& D 10/15  8PM IOL, cervical ripening cancelled.

## 2024-10-10 NOTE — ASSESSMENT & PLAN NOTE
35yo  at 39.1wks presents for routine prenatal visit     Pt denies contractions, vaginal bleeding, leakage of fluid. Endorses fetal movement.    -continue PNVs   -GBS negative   -birth plan signed today   -s/p tdap   -SVE /3, membranes stripped   -IOL scheduled 10/15/24 for PM, message sent to nurse navigators for patient to report in the AM due to her exam and prior  x2   -labor precautions provided  -return in 1 week

## 2024-10-11 ENCOUNTER — HOSPITAL ENCOUNTER (OUTPATIENT)
Dept: INFUSION CENTER | Facility: HOSPITAL | Age: 34
End: 2024-10-11
Payer: COMMERCIAL

## 2024-10-11 VITALS
TEMPERATURE: 97.8 F | DIASTOLIC BLOOD PRESSURE: 78 MMHG | HEART RATE: 72 BPM | RESPIRATION RATE: 18 BRPM | SYSTOLIC BLOOD PRESSURE: 113 MMHG

## 2024-10-11 DIAGNOSIS — O99.019 IRON DEFICIENCY ANEMIA DURING PREGNANCY: Primary | ICD-10-CM

## 2024-10-11 DIAGNOSIS — D50.9 IRON DEFICIENCY ANEMIA DURING PREGNANCY: Primary | ICD-10-CM

## 2024-10-11 PROCEDURE — 96365 THER/PROPH/DIAG IV INF INIT: CPT

## 2024-10-11 RX ORDER — SODIUM CHLORIDE 9 MG/ML
20 INJECTION, SOLUTION INTRAVENOUS ONCE
OUTPATIENT
Start: 2024-10-18

## 2024-10-11 RX ORDER — SODIUM CHLORIDE 9 MG/ML
20 INJECTION, SOLUTION INTRAVENOUS ONCE
Status: COMPLETED | OUTPATIENT
Start: 2024-10-11 | End: 2024-10-11

## 2024-10-11 RX ADMIN — IRON SUCROSE 200 MG: 20 INJECTION, SOLUTION INTRAVENOUS at 14:34

## 2024-10-11 RX ADMIN — SODIUM CHLORIDE 20 ML/HR: 9 INJECTION, SOLUTION INTRAVENOUS at 14:33

## 2024-10-12 ENCOUNTER — NURSE TRIAGE (OUTPATIENT)
Dept: OTHER | Facility: OTHER | Age: 34
End: 2024-10-12

## 2024-10-12 ENCOUNTER — HOSPITAL ENCOUNTER (INPATIENT)
Facility: HOSPITAL | Age: 34
LOS: 1 days | Discharge: HOME | End: 2024-10-14
Attending: STUDENT IN AN ORGANIZED HEALTH CARE EDUCATION/TRAINING PROGRAM | Admitting: STUDENT IN AN ORGANIZED HEALTH CARE EDUCATION/TRAINING PROGRAM
Payer: COMMERCIAL

## 2024-10-12 PROBLEM — O47.9 UTERINE CONTRACTIONS: Status: ACTIVE | Noted: 2024-10-12

## 2024-10-13 ENCOUNTER — ANESTHESIA (INPATIENT)
Dept: ANESTHESIOLOGY | Facility: HOSPITAL | Age: 34
End: 2024-10-13
Payer: COMMERCIAL

## 2024-10-13 ENCOUNTER — ANESTHESIA EVENT (INPATIENT)
Dept: ANESTHESIOLOGY | Facility: HOSPITAL | Age: 34
End: 2024-10-13
Payer: COMMERCIAL

## 2024-10-13 LAB
ABO GROUP BLD: NORMAL
BASE EXCESS BLDCOV CALC-SCNC: -0.7 MMOL/L (ref 1–9)
BLD GP AB SCN SERPL QL: NEGATIVE
ERYTHROCYTE [DISTWIDTH] IN BLOOD BY AUTOMATED COUNT: 14.6 % (ref 11.6–15.1)
HCO3 BLDCOV-SCNC: 22.7 MMOL/L (ref 12.2–28.6)
HCT VFR BLD AUTO: 29 % (ref 34.8–46.1)
HGB BLD-MCNC: 8.9 G/DL (ref 11.5–15.4)
HOLD SPECIMEN: NORMAL
MCH RBC QN AUTO: 25.6 PG (ref 26.8–34.3)
MCHC RBC AUTO-ENTMCNC: 30.7 G/DL (ref 31.4–37.4)
MCV RBC AUTO: 83 FL (ref 82–98)
OXYHGB MFR BLDCOV: 91.4 %
PCO2 BLDCOV: 34 MM HG (ref 27–43)
PH BLDCOV: 7.44 [PH] (ref 7.19–7.49)
PLATELET # BLD AUTO: 262 THOUSANDS/UL (ref 149–390)
PMV BLD AUTO: 12 FL (ref 8.9–12.7)
PO2 BLDCOV: 46.8 MM HG (ref 15–45)
RBC # BLD AUTO: 3.48 MILLION/UL (ref 3.81–5.12)
RH BLD: POSITIVE
SAO2 % BLDCOV: 19.5 ML/DL
SPECIMEN EXPIRATION DATE: NORMAL
TREPONEMA PALLIDUM IGG+IGM AB [PRESENCE] IN SERUM OR PLASMA BY IMMUNOASSAY: NORMAL
WBC # BLD AUTO: 10.23 THOUSAND/UL (ref 4.31–10.16)

## 2024-10-13 PROCEDURE — 4A1HXCZ MONITORING OF PRODUCTS OF CONCEPTION, CARDIAC RATE, EXTERNAL APPROACH: ICD-10-PCS | Performed by: STUDENT IN AN ORGANIZED HEALTH CARE EDUCATION/TRAINING PROGRAM

## 2024-10-13 PROCEDURE — 85027 COMPLETE CBC AUTOMATED: CPT

## 2024-10-13 PROCEDURE — 86901 BLOOD TYPING SEROLOGIC RH(D): CPT

## 2024-10-13 PROCEDURE — 86900 BLOOD TYPING SEROLOGIC ABO: CPT

## 2024-10-13 PROCEDURE — 10907ZC DRAINAGE OF AMNIOTIC FLUID, THERAPEUTIC FROM PRODUCTS OF CONCEPTION, VIA NATURAL OR ARTIFICIAL OPENING: ICD-10-PCS | Performed by: STUDENT IN AN ORGANIZED HEALTH CARE EDUCATION/TRAINING PROGRAM

## 2024-10-13 PROCEDURE — 59400 OBSTETRICAL CARE: CPT | Performed by: STUDENT IN AN ORGANIZED HEALTH CARE EDUCATION/TRAINING PROGRAM

## 2024-10-13 PROCEDURE — 82805 BLOOD GASES W/O2 SATURATION: CPT | Performed by: STUDENT IN AN ORGANIZED HEALTH CARE EDUCATION/TRAINING PROGRAM

## 2024-10-13 PROCEDURE — 86850 RBC ANTIBODY SCREEN: CPT

## 2024-10-13 PROCEDURE — NC001 PR NO CHARGE: Performed by: STUDENT IN AN ORGANIZED HEALTH CARE EDUCATION/TRAINING PROGRAM

## 2024-10-13 PROCEDURE — 99214 OFFICE O/P EST MOD 30 MIN: CPT

## 2024-10-13 PROCEDURE — G0463 HOSPITAL OUTPT CLINIC VISIT: HCPCS

## 2024-10-13 PROCEDURE — 86780 TREPONEMA PALLIDUM: CPT

## 2024-10-13 RX ORDER — ONDANSETRON 2 MG/ML
4 INJECTION INTRAMUSCULAR; INTRAVENOUS EVERY 6 HOURS PRN
Status: DISCONTINUED | OUTPATIENT
Start: 2024-10-13 | End: 2024-10-13

## 2024-10-13 RX ORDER — OXYTOCIN/RINGER'S LACTATE 30/500 ML
250 PLASTIC BAG, INJECTION (ML) INTRAVENOUS CONTINUOUS
Status: ACTIVE | OUTPATIENT
Start: 2024-10-13 | End: 2024-10-13

## 2024-10-13 RX ORDER — DIPHENHYDRAMINE HCL 25 MG
25 TABLET ORAL EVERY 6 HOURS PRN
Status: DISCONTINUED | OUTPATIENT
Start: 2024-10-13 | End: 2024-10-14 | Stop reason: HOSPADM

## 2024-10-13 RX ORDER — OXYTOCIN/RINGER'S LACTATE 30/500 ML
PLASTIC BAG, INJECTION (ML) INTRAVENOUS
Status: COMPLETED
Start: 2024-10-13 | End: 2024-10-13

## 2024-10-13 RX ORDER — BENZOCAINE/MENTHOL 6 MG-10 MG
1 LOZENGE MUCOUS MEMBRANE DAILY PRN
Status: DISCONTINUED | OUTPATIENT
Start: 2024-10-13 | End: 2024-10-14 | Stop reason: HOSPADM

## 2024-10-13 RX ORDER — SODIUM CHLORIDE, SODIUM LACTATE, POTASSIUM CHLORIDE, CALCIUM CHLORIDE 600; 310; 30; 20 MG/100ML; MG/100ML; MG/100ML; MG/100ML
125 INJECTION, SOLUTION INTRAVENOUS CONTINUOUS
Status: DISCONTINUED | OUTPATIENT
Start: 2024-10-13 | End: 2024-10-13

## 2024-10-13 RX ORDER — ACETAMINOPHEN 325 MG/1
650 TABLET ORAL EVERY 6 HOURS
Status: DISCONTINUED | OUTPATIENT
Start: 2024-10-13 | End: 2024-10-14 | Stop reason: HOSPADM

## 2024-10-13 RX ORDER — DOCUSATE SODIUM 100 MG/1
100 CAPSULE, LIQUID FILLED ORAL 2 TIMES DAILY
Status: DISCONTINUED | OUTPATIENT
Start: 2024-10-13 | End: 2024-10-14 | Stop reason: HOSPADM

## 2024-10-13 RX ORDER — IBUPROFEN 600 MG/1
600 TABLET, FILM COATED ORAL EVERY 6 HOURS
Status: DISCONTINUED | OUTPATIENT
Start: 2024-10-13 | End: 2024-10-13

## 2024-10-13 RX ORDER — ONDANSETRON 2 MG/ML
4 INJECTION INTRAMUSCULAR; INTRAVENOUS EVERY 8 HOURS PRN
Status: DISCONTINUED | OUTPATIENT
Start: 2024-10-13 | End: 2024-10-14 | Stop reason: HOSPADM

## 2024-10-13 RX ORDER — BUPIVACAINE HYDROCHLORIDE 2.5 MG/ML
30 INJECTION, SOLUTION EPIDURAL; INFILTRATION; INTRACAUDAL ONCE AS NEEDED
Status: DISCONTINUED | OUTPATIENT
Start: 2024-10-13 | End: 2024-10-13

## 2024-10-13 RX ORDER — CALCIUM CARBONATE 500 MG/1
1000 TABLET, CHEWABLE ORAL DAILY PRN
Status: DISCONTINUED | OUTPATIENT
Start: 2024-10-13 | End: 2024-10-14 | Stop reason: HOSPADM

## 2024-10-13 RX ADMIN — DOCUSATE SODIUM 100 MG: 100 CAPSULE, LIQUID FILLED ORAL at 17:59

## 2024-10-13 RX ADMIN — Medication 250 MILLI-UNITS/MIN: at 04:59

## 2024-10-13 RX ADMIN — SODIUM CHLORIDE, SODIUM LACTATE, POTASSIUM CHLORIDE, AND CALCIUM CHLORIDE 999 ML/HR: .6; .31; .03; .02 INJECTION, SOLUTION INTRAVENOUS at 00:00

## 2024-10-13 RX ADMIN — OXYTOCIN 250 MILLI-UNITS/MIN: 10 INJECTION INTRAVENOUS at 04:59

## 2024-10-13 RX ADMIN — ROPIVACAINE HYDROCHLORIDE: 2 INJECTION, SOLUTION EPIDURAL; INFILTRATION at 02:15

## 2024-10-13 RX ADMIN — SODIUM CHLORIDE, SODIUM LACTATE, POTASSIUM CHLORIDE, AND CALCIUM CHLORIDE 125 ML/HR: .6; .31; .03; .02 INJECTION, SOLUTION INTRAVENOUS at 02:50

## 2024-10-13 RX ADMIN — SODIUM CHLORIDE, SODIUM LACTATE, POTASSIUM CHLORIDE, AND CALCIUM CHLORIDE 125 ML/HR: .6; .31; .03; .02 INJECTION, SOLUTION INTRAVENOUS at 04:35

## 2024-10-13 RX ADMIN — ACETAMINOPHEN 650 MG: 325 TABLET ORAL at 23:02

## 2024-10-13 RX ADMIN — ACETAMINOPHEN 650 MG: 325 TABLET ORAL at 17:59

## 2024-10-13 RX ADMIN — ACETAMINOPHEN 650 MG: 325 TABLET ORAL at 12:21

## 2024-10-13 RX ADMIN — BENZOCAINE AND LEVOMENTHOL 1 APPLICATION: 200; 5 SPRAY TOPICAL at 06:38

## 2024-10-13 RX ADMIN — SODIUM CHLORIDE, SODIUM LACTATE, POTASSIUM CHLORIDE, AND CALCIUM CHLORIDE 125 ML/HR: .6; .31; .03; .02 INJECTION, SOLUTION INTRAVENOUS at 01:35

## 2024-10-13 RX ADMIN — WITCH HAZEL 1 PAD: 500 SOLUTION RECTAL; TOPICAL at 06:38

## 2024-10-13 RX ADMIN — DOCUSATE SODIUM 100 MG: 100 CAPSULE, LIQUID FILLED ORAL at 09:01

## 2024-10-13 RX ADMIN — ACETAMINOPHEN 650 MG: 325 TABLET ORAL at 06:37

## 2024-10-13 NOTE — TELEPHONE ENCOUNTER
"Reason for Disposition   [1] History of prior delivery (multipara) AND [2] contractions < 10 minutes apart AND [3] present 1 hour    Answer Assessment - Initial Assessment Questions  1. ONSET: \"When did the symptoms begin?\"         10/12     2. CONTRACTIONS: \"Describe the contractions that you are having.\" (e.g., duration, frequency, regularity, severity)      Every 4 minutes    3. PREGNANCY: \"How many weeks pregnant are you?\"      39w3d    4. EMMANUELLE: \"What date are you expecting to deliver?\"      Has induction for Tuesday    5. PARITY: \"Have you had a baby before?\" If Yes, ask: \"How long did the labor last?\"      Yes    6. FETAL MOVEMENT: \"Has the baby's movement decreased or changed significantly from normal?\"      Good FM    7. OTHER SYMPTOMS: \"Do you have any other symptoms?\" (e.g., leaking fluid from vagina, vaginal bleeding, fever, hand/facial swelling)  Loss of mucus plug    Protocols used: Pregnancy - Labor-Adult-    "

## 2024-10-13 NOTE — H&P
H & P- Obstetrics   Rema Esposito 34 y.o. female MRN: [id number]  Unit/Bed#:  203-01 Encounter: [id number]    Assessment: 34 y.o.  at 39w4d admitted for labor .  SVE: /-2  FHT: Cat  bpm  Clinical EFW: 2 lbs 14 oz (58%) 29w3d ; Cephalic confirmed by TAUS  GBS status: Negative   Postpartum contraception plan: Undecided    Plan:   Iron deficiency anemia during pregnancy  Assessment & Plan  CBC on admission  Monitoring HG      Nuchal fold thickening on prenatal ultrasound  Assessment & Plan  NF thickening noted during pre page ultrasound  Pt declined further investigation    Short interval between pregnancies affecting pregnancy, antepartum  Assessment & Plan  [place] monitoring during labor    Bariatric surgery status complicating pregnancy, third trimester  Assessment & Plan  Advised to contact DP for testing supplies as she cannot tolerate early 1 hour GTT    Asthma  Assessment & Plan  Not on meds  Avoid Hemabate    * Uterine contractions  Assessment & Plan  Admit to L&D  CBC, RPR, Type & Screen  SVE: /-2  FHT: Cat I 120 bpm  Clinical EFW: 2 lbs 14 oz (58%) 29w3d ; Cephalic confirmed by TAUS  GBS status: Negative   Postpartum contraception plan: Undecided  Analgesia at maternal request  Epidural, AROM          Discussed case and plan w/ Dr. Orellana      Chief Complaint: contractions    HPI: Rema Esposito is a 34 y.o.  with an EMMANUELLE of 10/16/2024, by Ultrasound at 39w4d who is being admitted for labor . She complains of uterine contractions, occurring every 5 minutes, has no LOF, and reports no VB. She states that the baby moves as usual.    Patient Active Problem List   Diagnosis    Asthma    Bariatric surgery status complicating pregnancy, third trimester    Family history of cleft lip    39 weeks gestation of pregnancy    Maternal obesity affecting pregnancy, antepartum    History of intrauterine growth restriction in prior pregnancy, currently pregnant    History  of PCOS    Family history of narcolepsy    Short interval between pregnancies affecting pregnancy, antepartum    Family history of genetic disorder    Nuchal fold thickening on prenatal ultrasound    Iron deficiency anemia during pregnancy    Uterine contractions       Baby complications/comments: Nuchal fold thickening    Review of Systems   Constitutional:  Negative for chills and fever.   HENT:  Negative for ear pain and sore throat.    Eyes:  Negative for pain and visual disturbance.   Respiratory:  Negative for cough and shortness of breath.    Cardiovascular:  Negative for chest pain and palpitations.   Gastrointestinal:  Negative for abdominal pain, nausea and vomiting.   Genitourinary:  Negative for dysuria, hematuria and vaginal bleeding.   Musculoskeletal:  Negative for arthralgias and back pain.   Skin:  Negative for color change and rash.   Neurological:  Negative for seizures, syncope and headaches.   All other systems reviewed and are negative.      OB Hx:  OB History    Para Term  AB Living   3 2 2 0 0 2   SAB IAB Ectopic Multiple Live Births           2      # Outcome Date GA Lbr Lior/2nd Weight Sex Type Anes PTL Lv   3 Current            2 Term 23 38w2d / 00:02 2530 g (5 lb 9.2 oz) M Vag-Spont EPI N KOKI   1 Term 07/22/10 40w0d  3374 g (7 lb 7 oz) F Vag-Spont EPI N KOKI       Past Medical Hx:  Past Medical History:   Diagnosis Date    Asthma     no symptoms now    Narcolepsy     PCOS (polycystic ovarian syndrome)     diagnosed 2019    Sleep apnea     Varicella     had vaccines       Past Surgical hx:  Past Surgical History:   Procedure Laterality Date    GASTRIC BYPASS      23       Social Hx:  Alcohol use: No  Tobacco use: No  Other substance use: No      No Known Allergies      Medications Prior to Admission:     cetirizine (ZyrTEC) 10 mg tablet    diphenhydrAMINE (Benadryl Allergy) 25 mg tablet    Prenatal MV-Min-Fe Fum-FA-DHA (PRENATAL 1 PO)    Objective:  Temp:  [98.1 °F  (36.7 °C)] 98.1 °F (36.7 °C)  HR:  [66] 66  BP: (110)/(63) 110/63  Resp:  [18] 18  Body mass index is 34.54 kg/m².     Physical Exam:  Physical Exam  Constitutional:       Appearance: Normal appearance.   Genitourinary:      Vulva normal.   Cardiovascular:      Rate and Rhythm: Normal rate and regular rhythm.   Pulmonary:      Effort: Pulmonary effort is normal. No respiratory distress.   Abdominal:      Palpations: Abdomen is soft.      Tenderness: There is no abdominal tenderness.   Musculoskeletal:         General: Normal range of motion.      Cervical back: Normal range of motion.   Neurological:      General: No focal deficit present.      Mental Status: She is alert and oriented to person, place, and time.   Skin:     General: Skin is warm and dry.   Psychiatric:         Mood and Affect: Mood normal.         Behavior: Behavior normal.   Vitals reviewed.            FHT:  Baseline Rate (FHR): 120 bpm  Variability: Moderate  Accelerations: 15 x 15 or greater  Decelerations: (!) Variable,   FHR Category: Category II    TOCO:   Contraction Frequency (minutes): 4  Contraction Duration (seconds):   Contraction Intensity: Mild/Moderate    Lab Results   Component Value Date    WBC 10.23 (H) 10/13/2024    HGB 8.9 (L) 10/13/2024    HCT 29.0 (L) 10/13/2024     10/13/2024     Lab Results   Component Value Date    K 3.9 05/17/2022     05/17/2022    CO2 29 05/17/2022    BUN 15 05/17/2022    CREATININE 0.69 05/17/2022    AST 30 05/17/2022    ALT 78 (H) 05/17/2022     Prenatal Labs: Reviewed      Blood type: B positive  Antibody: Negative  GBS: Negative  HIV: Non-reative  Rubella: Immune  Syphilis IgM/IgG: Non-reactive  HBsAg: Non-reactive  HCAb: Non-reactive  Chlamydia: Negative  Gonorrhea: Negative  Diabetes 1 hour screen: no GDM (Dr. Wilkins's note 09/04/2024)  3 hour glucose: no GDM  Platelets: 262  Hgb: 8.9  <2 Midnights  INPATIENT     Signature/Title: Rene Lima MD  Date:  10/13/2024  Time: 1:56 AM

## 2024-10-13 NOTE — ANESTHESIA PROCEDURE NOTES
Epidural Block    Patient location during procedure: OB/L&D  Start time: 10/13/2024 2:05 AM  Reason for block: procedure for pain  Staffing  Performed by: Melva Grewal MD  Authorized by: Melva Grewal MD    Preanesthetic Checklist  Completed: patient identified, IV checked, site marked, risks and benefits discussed, surgical consent, monitors and equipment checked, pre-op evaluation and timeout performed  Epidural  Patient position: sitting  Prep: ChloraPrep  Sedation Level: no sedation  Patient monitoring: frequent blood pressure checks, continuous pulse oximetry and heart rate  Approach: midline  Location: lumbar, L3-4  Injection technique: LYRIC saline  Needle  Needle type: Tuohy   Needle gauge: 17 G  Needle insertion depth: 7 cm  Catheter type: multi-orifice  Catheter size: 19 G  Catheter at skin depth: 12 cm  Catheter securement method: stabilization device and clear occlusive dressing  Test dose: negativelidocaine-epinephrine (XYLOCAINE-MPF/EPINEPHRINE) 1.5 %-1:200,000 injection 3 mL - Epidural   3 mL - 10/13/2024 2:06:00 AM  Assessment  Sensory level: T10  Number of attempts: 1negative aspiration for CSF, negative aspiration for heme and no paresthesia on injection  patient tolerated the procedure well with no immediate complications

## 2024-10-13 NOTE — PLAN OF CARE
Problem: BIRTH - VAGINAL/ SECTION  Goal: Fetal and maternal status remain reassuring during the birth process  Description: INTERVENTIONS:  - Monitor vital signs  - Monitor fetal heart rate  - Monitor uterine activity  - Monitor labor progression (vaginal delivery)  - DVT prophylaxis  - Antibiotic prophylaxis  Outcome: Progressing  Goal: Emotionally satisfying birthing experience for mother/fetus  Description: Interventions:  - Assess, plan, implement and evaluate the nursing care given to the patient in labor  - Advocate the philosophy that each childbirth experience is a unique experience and support the family's chosen level of involvement and control during the labor process   - Actively participate in both the patient's and family's teaching of the birth process  - Consider cultural, Sabianist and age-specific factors and plan care for the patient in labor  Outcome: Progressing     Problem: POSTPARTUM  Goal: Experiences normal postpartum course  Description: INTERVENTIONS:  - Monitor maternal vital signs  - Assess uterine involution and lochia  Outcome: Progressing  Goal: Appropriate maternal -  bonding  Description: INTERVENTIONS:  - Identify family support  - Assess for appropriate maternal/infant bonding   -Encourage maternal/infant bonding opportunities  - Referral to  or  as needed  Outcome: Progressing  Goal: Establishment of infant feeding pattern  Description: INTERVENTIONS:  - Assess breast/bottle feeding  - Refer to lactation as needed  Outcome: Progressing  Goal: Incision(s), wounds(s) or drain site(s) healing without S/S of infection  Description: INTERVENTIONS  - Assess and document dressing, incision, wound bed, drain sites and surrounding tissue  - Provide patient and family education  - Perform skin care/dressing changes every   Outcome: Progressing     Problem: PAIN - ADULT  Goal: Verbalizes/displays adequate comfort level or baseline comfort  level  Description: Interventions:  - Encourage patient to monitor pain and request assistance  - Assess pain using appropriate pain scale  - Administer analgesics based on type and severity of pain and evaluate response  - Implement non-pharmacological measures as appropriate and evaluate response  - Consider cultural and social influences on pain and pain management  - Notify physician/advanced practitioner if interventions unsuccessful or patient reports new pain  Outcome: Progressing     Problem: INFECTION - ADULT  Goal: Absence or prevention of progression during hospitalization  Description: INTERVENTIONS:  - Assess and monitor for signs and symptoms of infection  - Monitor lab/diagnostic results  - Monitor all insertion sites, i.e. indwelling lines, tubes, and drains  - Monitor endotracheal if appropriate and nasal secretions for changes in amount and color  - [place] appropriate cooling/warming therapies per order  - Administer medications as ordered  - Instruct and encourage patient and family to use good hand hygiene technique  - Identify and instruct in appropriate isolation precautions for identified infection/condition  Outcome: Progressing  Goal: Absence of fever/infection during neutropenic period  Description: INTERVENTIONS:  - Monitor WBC    Outcome: Progressing     Problem: SAFETY ADULT  Goal: Patient will remain free of falls  Description: INTERVENTIONS:  - Educate patient/family on patient safety including physical limitations  - Instruct patient to call for assistance with activity   - Consult OT/PT to assist with strengthening/mobility   - Keep Call bell within reach  - Keep bed low and locked with side rails adjusted as appropriate  - Keep care items and personal belongings within reach  - Initiate and maintain comfort rounds  - Make Fall Risk Sign visible to staff  - Offer Toileting every  Hours, in advance of need  - Initiate/Maintain alarm  - Obtain necessary fall risk management  equipment:   - Apply yellow socks and bracelet for high fall risk patients  - Consider moving patient to room near nurses station  Outcome: Progressing  Goal: Maintain or return to baseline ADL function  Description: INTERVENTIONS:  -  Assess patient's ability to carry out ADLs; assess patient's baseline for ADL function and identify physical deficits which impact ability to perform ADLs (bathing, care of mouth/teeth, toileting, grooming, dressing, etc.)  - Assess/evaluate cause of self-care deficits   - Assess range of motion  - Assess patient's mobility; develop plan if impaired  - Assess patient's need for assistive devices and provide as appropriate  - Encourage maximum independence but intervene and supervise when necessary  - Involve family in performance of ADLs  - Assess for home care needs following discharge   - Consider OT consult to assist with ADL evaluation and planning for discharge  - Provide patient education as appropriate  Outcome: Progressing  Goal: Maintains/Returns to pre admission functional level  Description: INTERVENTIONS:  - Perform AM-PAC 6 Click Basic Mobility/ Daily Activity assessment daily.  - Set and communicate daily mobility goal to care team and patient/family/caregiver.   - Collaborate with rehabilitation services on mobility goals if consulted  - Perform Range of Motion  times a day.  - Reposition patient every  hours.  - Dangle patient  times a day  - Stand patient  times a day  - Ambulate patient  times a day  - Out of bed to chair  times a day   - Out of bed for meals times a day  - Out of bed for toileting  - Record patient progress and toleration of activity level   Outcome: Progressing     Problem: Knowledge Deficit  Goal: Patient/family/caregiver demonstrates understanding of disease process, treatment plan, medications, and discharge instructions  Description: Complete learning assessment and assess knowledge base.  Interventions:  - Provide teaching at level of  understanding  - Provide teaching via preferred learning methods  Outcome: Progressing  Goal: Verbalizes understanding of labor plan  Description: Assess patient/family/caregiver's baseline knowledge level and ability to understand information.  Provide education via patient/family/caregiver's preferred learning method at appropriate level of understanding.     1. Provide teaching at level of understanding.  2. Provide teaching via preferred learning method(s).  Outcome: Progressing     Problem: DISCHARGE PLANNING  Goal: Discharge to home or other facility with appropriate resources  Description: INTERVENTIONS:  - Identify barriers to discharge w/patient and caregiver  - Arrange for needed discharge resources and transportation as appropriate  - Identify discharge learning needs (meds, wound care, etc.)  - Arrange for interpretive services to assist at discharge as needed  - Refer to Case Management Department for coordinating discharge planning if the patient needs post-hospital services based on physician/advanced practitioner order or complex needs related to functional status, cognitive ability, or social support system  Outcome: Progressing     Problem: Labor & Delivery  Goal: Manages discomfort  Description: Assess and monitor for signs and symptoms of discomfort.  Assess patient's pain level regularly and per hospital policy.  Administer medications as ordered. Support use of nonpharmacological methods to help control pain such as distraction, imagery, relaxation, and application of heat and cold.  Collaborate with interdisciplinary team and patient to determine appropriate pain management plan.    1. Include patient in decisions related to comfort.  2. Offer non-pharmacological pain management interventions.  3. Report ineffective pain management to physician.  Outcome: Progressing  Goal: Patient vital signs are stable  Description: 1. Assess vital signs - vaginal delivery.  Outcome: Progressing

## 2024-10-13 NOTE — ANESTHESIA PREPROCEDURE EVALUATION
Procedure:  LABOR ANALGESIA    Relevant Problems   GYN   (+) 39 weeks gestation of pregnancy      HEMATOLOGY   (+) Iron deficiency anemia during pregnancy      PULMONARY   (+) Asthma      Previous epidural was one sided and had to be replaced, second placement no issues.    Physical Exam    Airway    Mallampati score: II  TM Distance: >3 FB  Neck ROM: full     Dental   No notable dental hx     Cardiovascular      Pulmonary      Other Findings  post-pubertal.      Anesthesia Plan  ASA Score- 2     Anesthesia Type- epidural with ASA Monitors.         Additional Monitors:     Airway Plan:            Plan Factors-    Chart reviewed.   Existing labs reviewed. Patient summary reviewed.                  Induction- intravenous.    Postoperative Plan-     Perioperative Resuscitation Plan - Level 1 - Full Code.       Informed Consent- Anesthetic plan and risks discussed with patient.  I personally reviewed this patient with the CRNA. Discussed and agreed on the Anesthesia Plan with the CRNA..

## 2024-10-13 NOTE — ANESTHESIA POSTPROCEDURE EVALUATION
Post-Op Assessment Note          Post-op block assessment: catheter intact and no complications    No anethesia notable event occurred.    Staff: CRNA   Comments: epidural removed withiout complications -catheter intact        Last Filed PACU Vitals:  Vitals Value Taken Time   Temp     Pulse 82 10/13/24 0711   /59 10/13/24 0711   Resp     SpO2     Vitals shown include unfiled device data.    Modified Ingrid:  No data recorded

## 2024-10-13 NOTE — ASSESSMENT & PLAN NOTE
Admit to L&D  CBC, RPR, Type & Screen  SVE: 5/80/-2  FHT: Cat I 120 bpm  Clinical EFW: 2 lbs 14 oz (58%) 29w3d ; Cephalic confirmed by TAUS  GBS status: Negative   Postpartum contraception plan: Undecided  Analgesia at maternal request  Epidural, AROM

## 2024-10-13 NOTE — DISCHARGE SUMMARY
Discharge Summary - OB/GYN   Name: Rema Esposito 34 y.o. female I MRN: [id number]  Unit/Bed#: -01 I Date of Admission: 10/12/2024   Date of Service: 10/14/2024 I Hospital Day: 1      ADMISSION  Patient of: [facility]  Admission Date: 10/12/2024   Admitting Attending: Dr. Vero Orellana MD  Admitting Diagnoses:   Patient Active Problem List   Diagnosis    Asthma    Bariatric surgery status complicating pregnancy, third trimester    Family history of cleft lip     (spontaneous vaginal delivery)    Maternal obesity affecting pregnancy, antepartum    History of intrauterine growth restriction in prior pregnancy, currently pregnant    History of PCOS    Family history of narcolepsy    Short interval between pregnancies affecting pregnancy, antepartum    Family history of genetic disorder    Nuchal fold thickening on prenatal ultrasound    Iron deficiency anemia during pregnancy    Uterine contractions       DELIVERY  Delivery Method: Vaginal, Spontaneous   Delivery Date and Time: 10/13/2024 4:58 AM  Delivery Attending: Vero Orellana    DISCHARGE  Discharge Date: 10/14/24  Discharge Attending: Dr. Connolly  Discharge Diagnosis: Same, Delivered    Clinical course: Admission to Delivery  Rema Esposito is a 34 y.o.  who was admitted at 39w4d for labor.  On admission, cervical exam was 5/80/-2, and she received an epidural for pain control.  She progressed to 7/80/-1, and AROM was performed for clear fluid.  She then progressed to complete dilation and began to push. After pushing for 7 minutes, the patient delivered a viable male  at 0458 on 10/13/2024, weight pending at time of dictation, apgars of 9 (1 min) and 9 (5 min).     Delivery  Route of Delivery:   Anesthesia: epidural  QBL: 23 mL  Delivery: Vaginal, Spontaneous at 10/13/2024 4:58 AM  No Laceration: Perineal: None Repaired?      Baby's Weight: 3155 g (6 lb 15.3 oz); 111.29    Apgar scores: 9   and 9  at 1 and 5 minutes, respectively    Clinical Course: Post-Delivery:  The post delivery course was unremarkable.    On the day of discharge, the patient was ambulating, voiding spontaneously, tolerating oral intake, and hemodynamically stable. She was able to reasonably perform all ADLs. She had appropriate bowel function. Pain was well-controlled. She was discharged home on postpartum/postop day #1 without complications. Patient was instructed to follow up with her OB as an outpatient and was given appropriate warnings to call her provider with problems or concerns.    Pertinent lab findings included:   Blood type B+.     Last three Hgb values:  Lab Results   Component Value Date    HGB 8.9 (L) 10/13/2024    HGB 8.8 (L) 10/03/2024    HGB 9.8 (L) 2024        Problem-specific follow-up plans included the following:  Assessment & Plan  Uterine contractions  Admit to L&D  CBC, RPR, Type & Screen  SVE: 5/80/-2  FHT: Cat I 120 bpm  Clinical EFW: 2 lbs 14 oz (58%) 29w3d ; Cephalic confirmed by TAUS  GBS status: Negative   Postpartum contraception plan: Undecided  Analgesia at maternal request  Epidural, AROM  Asthma  Not on meds  Avoid Hemabate  Bariatric surgery status complicating pregnancy, third trimester  Advised to contact DP for testing supplies as she cannot tolerate early 1 hour GTT   (spontaneous vaginal delivery)       Continue routine post partum care  Pain well controlled: tylenol/motrin scheduled  Lochia within normal limits: continue to monitor   OOB: as able, encourage ambulation  Passing flatus  Voiding spontaneously  Breastfeeding/Bottlefeeding  Contraception: undecided  Baby in: room  Dispo: anticipate 10/14/24     Maternal obesity affecting pregnancy, antepartum    History of intrauterine growth restriction in prior pregnancy, currently pregnant    Short interval between pregnancies affecting pregnancy, antepartum  [place] monitoring during labor  Family history of genetic  disorder    Nuchal fold thickening on prenatal ultrasound  NF thickening noted during pre  ultrasound  Pt declined further investigation  Iron deficiency anemia during pregnancy  CBC on admission  Monitoring HG      Discharge med list:  Contraception: undecided     Medication List      ASK your doctor about these medications     Benadryl Allergy 25 mg tablet; Generic drug: diphenhydrAMINE   cetirizine 10 mg tablet; Commonly known as: ZyrTEC   PRENATAL 1 PO       Condition at discharge:   good     Disposition:   Home    Planned Readmission:   Sonia Spence DO   PGY-1, Family Medicine  [facility]

## 2024-10-13 NOTE — L&D DELIVERY NOTE
OBGYN Vaginal Delivery Summary  Rema Esposito 34 y.o. female MRN: [id number]  Unit/Bed#: -01 Encounter: [id number]    Predelivery Diagnosis:  1. Pregnancy at 39w4d  2. Iron deficiency anemia   3. Short interval pregnancy  4. Nuchal cord thickening on prenatal ultrasound  6. Bariatric surgery     Postdelivery Diagnosis:  1. Same as above  2. Delivery of term     Procedure: spontaneous vaginal delivery, repair of no laceration(s)    Attending: Dr. Orellana    Assistant: Dr. Rene Gillespie    Anesthesia: Epidural    QBL: 23 mL  Admission H.9 g/dL  Admission platelets: 262 thousands/uL    Complications: none apparent    Specimens: cord blood, arterial and venous cord blood gases, placenta to storage    Findings:   1. Viable male at 0458, with APGARS of 9 and 9 at 1 and 5 minutes respectively. Weight pending at time of dictation.  2. Spontaneous delivery of intact placenta at 0508. Central insertion of three vessel umbilical cord  3. No laceration  4. Blood gases:  Umbilical Cord Venous Blood Gas:  Results from last 7 days   Lab Units 10/13/24  0508   PH COV  7.442   PCO2 COV mm HG 34.0   HCO3 COV mmol/L 22.7   BASE EXC COV mmol/L -0.7*   O2 CT CD VB mL/dL 19.5   O2 HGB, VENOUS CORD % 91.4       Disposition:  Patient tolerated the procedure well and was recovering in labor and delivery room.    Brief history and labor course:  Rema Esposito is a 34 y.o.  at 39wk4d. She presented to labor and delivery in labor. She was admitted, an epidural was placed, AROM was performed, noted to be clear fluid she progressed to complete cervical dilation and began pushing.     Description of procedure  After pushing for 7 minutes, the patient delivered a viable male  at 0458 on 10/13/2024, weight pending at time of dictation, apgars of 9 (1 min) and 9 (5 min). The fetal vertex delivered spontaneously. Baby restituted  to LOT. There was no nuchal cord. The anterior (right) shoulder  delivered atraumatically with maternal expulsive forces and the assistance of gentle downward traction. The posterior shoulder delivered with maternal expulsive forces and the assistance of gentle upward traction. The remainder of the fetus delivered spontaneously.     Upon delivery the infant was placed on the mother's abdomen and delayed cord clamping was performed. The umbilical cord was then doubly clamped and cut. The infant was noted to cry spontaneously and was moving all extremities appropriately. There was no evidence for injury. Awaiting nurse resuscitators evaluated the . Arterial and venous cord blood gases and cord blood were collected for analysis and were promptly sent to the lab. In the immediate post-partum, IV pitocin was administered, and the uterus was noted to contract down well with massage and pitocin. The placenta delivered spontaneously at 0508 and was noted to be intact and had a centrally inserted 3 vessel cord. The placenta was sent to storage.    The vagina, cervix, perineum, and rectum were inspected. No laceration(s) were noted.    At the conclusion of the procedure, all needle, sponge, and instrument counts were noted to be correct. Patient tolerated the procedure well and was allowed to recover in labor and delivery room with family and  before being transferred to the post-partum floor.     Dr. Orellana was present and participated in all key portions of the case.    Rene Lima MD  10/13/2024  5:20 AM

## 2024-10-13 NOTE — PLAN OF CARE
Problem: BIRTH - VAGINAL/ SECTION  Goal: Fetal and maternal status remain reassuring during the birth process  Description: INTERVENTIONS:  - Monitor vital signs  - Monitor fetal heart rate  - Monitor uterine activity  - Monitor labor progression (vaginal delivery)  - DVT prophylaxis  - Antibiotic prophylaxis  Outcome: Completed  Goal: Emotionally satisfying birthing experience for mother/fetus  Description: Interventions:  - Assess, plan, implement and evaluate the nursing care given to the patient in labor  - Advocate the philosophy that each childbirth experience is a unique experience and support the family's chosen level of involvement and control during the labor process   - Actively participate in both the patient's and family's teaching of the birth process  - Consider cultural, Mandaen and age-specific factors and plan care for the patient in labor  Outcome: Completed     Problem: Knowledge Deficit  Goal: Patient/family/caregiver demonstrates understanding of disease process, treatment plan, medications, and discharge instructions  Description: Complete learning assessment and assess knowledge base.  Interventions:  - Provide teaching at level of understanding  - Provide teaching via preferred learning methods  Outcome: Completed  Goal: Verbalizes understanding of labor plan  Description: Assess patient/family/caregiver's baseline knowledge level and ability to understand information.  Provide education via patient/family/caregiver's preferred learning method at appropriate level of understanding.     1. Provide teaching at level of understanding.  2. Provide teaching via preferred learning method(s).  Outcome: Completed     Problem: Labor & Delivery  Goal: Manages discomfort  Description: Assess and monitor for signs and symptoms of discomfort.  Assess patient's pain level regularly and per hospital policy.  Administer medications as ordered. Support use of nonpharmacological methods to help  control pain such as distraction, imagery, relaxation, and application of heat and cold.  Collaborate with interdisciplinary team and patient to determine appropriate pain management plan.    1. Include patient in decisions related to comfort.  2. Offer non-pharmacological pain management interventions.  3. Report ineffective pain management to physician.  Outcome: Completed  Goal: Patient vital signs are stable  Description: 1. Assess vital signs - vaginal delivery.  Outcome: Completed

## 2024-10-13 NOTE — OB LABOR/OXYTOCIN SAFETY PROGRESS
Oxytocin Safety Progress Check Note - Rema Hoovernte 34 y.o. female MRN: [id number]    Unit/Bed#: -01 Encounter: [id number]       Contraction Frequency (minutes): 4.5-5.5  Contraction Intensity: Mild/Moderate  Uterine Activity Characteristics: Regular  Cervical Dilation: 7        Cervical Effacement: 80  Fetal Station: -1  Baseline Rate (FHR): 120 bpm  Fetal Heart Rate (FHT): 120 BPM  FHR Category: I               Vital Signs:   Vitals:    10/13/24 0255   BP: (!) 90/49   Pulse: 67   Resp:    Temp:    SpO2:        Notes/comments:   Pt comfortable with the contractions after the epidural. Progressing in labor, AROM cl   Discussed with the pt about the labor course.    Rene Lima MD 10/13/2024 3:41 AM

## 2024-10-14 VITALS
OXYGEN SATURATION: 98 % | HEART RATE: 84 BPM | BODY MASS INDEX: 34.39 KG/M2 | HEIGHT: 66 IN | RESPIRATION RATE: 20 BRPM | TEMPERATURE: 97.6 F | DIASTOLIC BLOOD PRESSURE: 56 MMHG | WEIGHT: 214 LBS | SYSTOLIC BLOOD PRESSURE: 115 MMHG

## 2024-10-14 PROCEDURE — NC001 PR NO CHARGE: Performed by: OBSTETRICS & GYNECOLOGY

## 2024-10-14 PROCEDURE — 99024 POSTOP FOLLOW-UP VISIT: CPT | Performed by: OBSTETRICS & GYNECOLOGY

## 2024-10-14 NOTE — ASSESSMENT & PLAN NOTE
Continue routine post partum care  Pain well controlled: tylenol/motrin scheduled  Lochia within normal limits: continue to monitor   OOB: as able, encourage ambulation  Passing flatus  Voiding spontaneously  Breastfeeding/Bottlefeeding  Contraception: undecided  Baby in: room  Dispo: anticipate 10/14/24

## 2024-10-14 NOTE — LACTATION NOTE
This note was copied from a baby's chart.  CONSULT - LACTATION  Baby Boy (Lindsey Esposito 0 days male MRN: [id number]    [facility] AN NURSERY Room / Bed: (N)/(N) Encounter: [id number]    Maternal Information     MOTHER:  Rema García  Maternal Age: 34 y.o.  OB History: # 1 - Date: 07/22/10, Sex: Female, Weight: 3374 g (7 lb 7 oz), GA: 40w0d, Type: Vaginal, Spontaneous, Apgar1: None, Apgar5: None, Living: Living, Birth Comments: None    # 2 - Date: 23, Sex: Male, Weight: 2530 g (5 lb 9.2 oz), GA: 38w2d, Type: Vaginal, Spontaneous, Apgar1: 8, Apgar5: 9, Living: Living, Birth Comments: None    # 3 - Date: 10/13/24, Sex: Male, Weight: 3155 g (6 lb 15.3 oz), GA: 39w4d, Type: Vaginal, Spontaneous, Apgar1: 9, Apgar5: 9, Living: Living, Birth Comments: None   Previouse breast reduction surgery? No    Lactation history:   Has patient previously breast fed: Yes   How long had patient previously breast fed: 4 months   Previous breast feeding complications: Infant separation (weaned when went back to work)     Past Surgical History:   Procedure Laterality Date    GASTRIC BYPASS      23       Birth information:  YOB: 2023   Time of birth: 4:58 AM   Sex: male   Delivery type: Vaginal, Spontaneous   Birth Weight: 3155 g (6 lb 15.3 oz)   Percent of Weight Change: 0%     Gestational Age: 39w4d   [unfilled]    Assessment     Breast and nipple assessment:  large, conical breasts, hx of PCOS but was able to ebf 2nd born    [place] Assessment: normal assessment    Feeding assessment: feeding well  LATCH:  Latch: Grasps breast, tongue down, lips flanged, rhythmic sucking   Audible Swallowing: Spontaneous and intermittent (24 hours old)   Type of Nipple: Everted (After stimulation)   Comfort (Breast/Nipple): Soft/non-tender   Hold (Positioning): Partial assist, teach one side, mother does other, staff holds   LATCH Score: 9          Feeding  recommendations:  breast feed on demand    Met with Rema whose feeding intention is to exclusively breastfeed her baby boy until returning to work. Rema offered 5 mls of formula via paced feed before lactation entered the room. Rema reports that baby slept a long time and then woke up crying too much to latch.    Reviewed waking baby every 2-3 hours and offering skin to skin often. Discussed hand expressing into baby's mouth if he does not rouse or root. Discussed the risks of early supplementation and the need to pump if any bottles are given.     With hand over hand lactation support, Rema was able to latch baby boy deeply on the breast. Reviewed basic latch techniques and positions. Encouraged offering both breasts every feeding. Reviewed hunger and fullness cues. Discussed cluster feeding and its importance for building supply. Discussed infant stomach size and typical feeding patterns.     Provided with and reviewed the Ready Set Baby and the Discharge Lactation Booklets. Encouraged utilizing nursing staff for lactation support over night.     Leo Tinajero MA 10/13/2024 8:31 PM

## 2024-10-14 NOTE — PLAN OF CARE
Problem: POSTPARTUM  Goal: Experiences normal postpartum course  Description: INTERVENTIONS:  - Monitor maternal vital signs  - Assess uterine involution and lochia  Outcome: Progressing  Goal: Appropriate maternal -  bonding  Description: INTERVENTIONS:  - Identify family support  - Assess for appropriate maternal/infant bonding   -Encourage maternal/infant bonding opportunities  - Referral to  or  as needed  Outcome: Progressing  Goal: Establishment of infant feeding pattern  Description: INTERVENTIONS:  - Assess breast/bottle feeding  - Refer to lactation as needed  Outcome: Progressing  Goal: Incision(s), wounds(s) or drain site(s) healing without S/S of infection  Description: INTERVENTIONS  - Assess and document dressing, incision, wound bed, drain sites and surrounding tissue  - Provide patient and family education  - Perform skin care/dressing changes every   Outcome: Progressing     Problem: PAIN - ADULT  Goal: Verbalizes/displays adequate comfort level or baseline comfort level  Description: Interventions:  - Encourage patient to monitor pain and request assistance  - Assess pain using appropriate pain scale  - Administer analgesics based on type and severity of pain and evaluate response  - Implement non-pharmacological measures as appropriate and evaluate response  - Consider cultural and social influences on pain and pain management  - Notify physician/advanced practitioner if interventions unsuccessful or patient reports new pain  Outcome: Progressing     Problem: INFECTION - ADULT  Goal: Absence or prevention of progression during hospitalization  Description: INTERVENTIONS:  - Assess and monitor for signs and symptoms of infection  - Monitor lab/diagnostic results  - Monitor all insertion sites, i.e. indwelling lines, tubes, and drains  - Monitor endotracheal if appropriate and nasal secretions for changes in amount and color  - [place] appropriate cooling/warming  therapies per order  - Administer medications as ordered  - Instruct and encourage patient and family to use good hand hygiene technique  - Identify and instruct in appropriate isolation precautions for identified infection/condition  Outcome: Progressing  Goal: Absence of fever/infection during neutropenic period  Description: INTERVENTIONS:  - Monitor WBC    Outcome: Progressing     Problem: SAFETY ADULT  Goal: Patient will remain free of falls  Description: INTERVENTIONS:  - Educate patient/family on patient safety including physical limitations  - Instruct patient to call for assistance with activity   - Consult OT/PT to assist with strengthening/mobility   - Keep Call bell within reach  - Keep bed low and locked with side rails adjusted as appropriate  - Keep care items and personal belongings within reach  - Initiate and maintain comfort rounds  - Make Fall Risk Sign visible to staff  - Offer Toileting every  Hours, in advance of need  - Initiate/Maintain alarm  - Obtain necessary fall risk management equipment:   - Apply yellow socks and bracelet for high fall risk patients  - Consider moving patient to room near nurses station  Outcome: Progressing  Goal: Maintain or return to baseline ADL function  Description: INTERVENTIONS:  -  Assess patient's ability to carry out ADLs; assess patient's baseline for ADL function and identify physical deficits which impact ability to perform ADLs (bathing, care of mouth/teeth, toileting, grooming, dressing, etc.)  - Assess/evaluate cause of self-care deficits   - Assess range of motion  - Assess patient's mobility; develop plan if impaired  - Assess patient's need for assistive devices and provide as appropriate  - Encourage maximum independence but intervene and supervise when necessary  - Involve family in performance of ADLs  - Assess for home care needs following discharge   - Consider OT consult to assist with ADL evaluation and planning for discharge  - Provide  patient education as appropriate  Outcome: Progressing  Goal: Maintains/Returns to pre admission functional level  Description: INTERVENTIONS:  - Perform AM-PAC 6 Click Basic Mobility/ Daily Activity assessment daily.  - Set and communicate daily mobility goal to care team and patient/family/caregiver.   - Collaborate with rehabilitation services on mobility goals if consulted  - Perform Range of Motion  times a day.  - Reposition patient every  hours.  - Dangle patient  times a day  - Stand patient  times a day  - Ambulate patient  times a day  - Out of bed to chair  times a day   - Out of bed for meals  times a day  - Out of bed for toileting  - Record patient progress and toleration of activity level   Outcome: Progressing     Problem: DISCHARGE PLANNING  Goal: Discharge to home or other facility with appropriate resources  Description: INTERVENTIONS:  - Identify barriers to discharge w/patient and caregiver  - Arrange for needed discharge resources and transportation as appropriate  - Identify discharge learning needs (meds, wound care, etc.)  - Arrange for interpretive services to assist at discharge as needed  - Refer to Case Management Department for coordinating discharge planning if the patient needs post-hospital services based on physician/advanced practitioner order or complex needs related to functional status, cognitive ability, or social support system  Outcome: Progressing

## 2024-10-14 NOTE — PROGRESS NOTES
"Progress Note - OB/GYN   Name: Rema Esposito 34 y.o. female I MRN: [id number]  Unit/Bed#: -01 I Date of Admission: 10/12/2024   Date of Service: 10/14/2024 I Hospital Day: 1     Assessment & Plan  Uterine contractions    Asthma    Bariatric surgery status complicating pregnancy, third trimester     (spontaneous vaginal delivery)       Continue routine post partum care  Pain well controlled: tylenol/motrin scheduled  Lochia within normal limits: continue to monitor   OOB: as able, encourage ambulation  Passing flatus  Voiding spontaneously  Breastfeeding/Bottlefeeding  Contraception: undecided  Baby in: room  Dispo: anticipate 10/14/24     Maternal obesity affecting pregnancy, antepartum    History of intrauterine growth restriction in prior pregnancy, currently pregnant    Short interval between pregnancies affecting pregnancy, antepartum    Family history of genetic disorder    Nuchal fold thickening on prenatal ultrasound    Iron deficiency anemia during pregnancy        Assessment and Plan     Rema Esposito is a patient of: [facility]. She is PPD# 1 s/p  spontaneous vaginal delivery  Recovering well and is stable         Disposition    - Anticipate discharge home on PPD# 1      Subjective/Objective     Chief Complaint: Postpartum State     Subjective:    Rema Esposito is PPD/POD#1 s/p  spontaneous vaginal delivery. She has no current complaints.  Pain is well controlled.  Patient is currently voiding.  She is ambulating.  Patient is currently passing flatus and has had no bowel movement. She is tolerating PO, and denies nausea or vomitting. Patient denies fever, chills, chest pain, shortness of breath, or calf tenderness. Lochia is minimal. She is  Breastfeeding and Bottle feeding. She is recovering well and is stable.       Vitals:   /75 (BP Location: Left arm)   Pulse 56   Temp 97.5 °F (36.4 °C) (Oral)   Resp 17   Ht 5' 6\" (1.676 m)   Wt " 97.1 kg (214 lb)   LMP  (LMP Unknown)   SpO2 98%   Breastfeeding Yes   BMI 34.54 kg/m²       Intake/Output Summary (Last 24 hours) at 10/14/2024 0622  Last data filed at 10/13/2024 1200  Gross per 24 hour   Intake --   Output 900 ml   Net -900 ml       Invasive Devices       Peripheral Intravenous Line  Duration             Peripheral IV 10/12/24 Left;Ventral (anterior) Forearm 1 day                    Physical Exam:   GEN: Rema Esposito appears well, alert and oriented x 3, pleasant and cooperative   CARDIO: RRR, no murmurs or rubs  RESP:  CTAB, no wheezes or rales  ABDOMEN: soft, no tenderness, no distention, fundus below umbilicus  EXTREMITIES: non tender, no erythema, b/l Toni's sign negative      Labs:     Hemoglobin   Date Value Ref Range Status   10/13/2024 8.9 (L) 11.5 - 15.4 g/dL Final   10/03/2024 8.8 (L) 11.5 - 15.4 g/dL Final     WBC   Date Value Ref Range Status   10/13/2024 10.23 (H) 4.31 - 10.16 Thousand/uL Final   10/03/2024 6.87 4.31 - 10.16 Thousand/uL Final     Platelets   Date Value Ref Range Status   10/13/2024 262 149 - 390 Thousands/uL Final   10/03/2024 254 149 - 390 Thousands/uL Final     Creatinine   Date Value Ref Range Status   05/17/2022 0.69 0.40 - 1.10 mg/dL Final   01/13/2022 0.74 0.40 - 1.10 mg/dL Final     AST   Date Value Ref Range Status   05/17/2022 30 <41 U/L Final   01/13/2022 20 <41 U/L Final     ALT   Date Value Ref Range Status   05/17/2022 78 (H) <56 U/L Final   01/13/2022 39 <56 U/L Final          Kendrick Spence DO  10/14/2024  6:22 AM

## 2024-10-14 NOTE — PLAN OF CARE

## 2024-10-15 NOTE — UTILIZATION REVIEW
"NOTIFICATION OF INPATIENT ADMISSION   MATERNITY/DELIVERY AUTHORIZATION REQUEST   SERVICING FACILITY:   Critical access hospital  Parent Child Health - L&D, [facility], NICU  [address] [place] [address]  Tax ID: [id number]  NPI: [id number]   ATTENDING PROVIDER:  Attending Name and NPI#: Vero Orellana Md [[id number]]  Address: [address] [place] [address]  Phone: [phone]   ADMISSION INFORMATION:  Place of Service: Inpatient [facility]  Place of Service Code: 21  Inpatient Admission Date/Time: 10/13/24  1:28 AM  Discharge Date/Time: 10/14/2024  1:56 PM  Admitting Diagnosis Code/Description:  39 weeks gestation of pregnancy [Z3A.39]  Uterine contractions [O47.9]  Encounter for full-term uncomplicated delivery [O80]     Mother: Rema Esposito 1990 Estimated Date of Delivery: 10/16/24  Delivering clinician: Vero Orellana   OB History          3    Para   3    Term   3       0    AB   0    Living   3         SAB        IAB        Ectopic        Multiple   0    Live Births   3               [facility] Name & MRN:   Information for the patient's :  Gómez Hoovernte, Baby Boy (Rema) [[id number]]   [facility] Delivery Information:  Sex: male  Delivered 10/13/2024 4:58 AM by Vaginal, Spontaneous; Gestational Age: 39w4d     Measurements:  Weight: 6 lb 15.3 oz (3155 g);  Height: 19\"    APGAR 1 minute 5 minutes 10 minutes   Totals: 9 9       UTILIZATION REVIEW CONTACT:  Joycelyn Martin Utilization   Network Utilization Review Department  Phone: [phone]  Fax [phone]  Email: [email or web address]  Contact for approvals/pending authorizations, clinical reviews, and discharge.     PHYSICIAN ADVISORY SERVICES:  Medical Necessity Denial & Fiog-cd-Jpno Review  Phone: [phone]  Fax: [phone]  Email: [email or web address]     DISCHARGE SUPPORT TEAM:  For Patients Discharge Needs & Updates  Phone: " [phone] opt. 2 Fax: [phone]  Email: [email or web address]          NOTIFICATION OF ADMISSION DISCHARGE   This is a Notification of Discharge from [facility]. Please be advised that this patient has been discharge from our facility. Below you will find the admission and discharge date and time including the patient’s disposition.   UTILIZATION REVIEW CONTACT:  Joycelyn Martin  Utilization   Network Utilization Review Department  Phone: [phone] x carefully listen to the prompts. All voicemails are confidential.  Email: [email or web address]     ADMISSION INFORMATION  PRESENTATION DATE: 10/12/2024 11:12 PM  OBERVATION ADMISSION DATE: N/A  INPATIENT ADMISSION DATE: 10/13/24  1:28 AM   DISCHARGE DATE: 10/14/2024  1:56 PM   DISPOSITION:Home/Self Care    Network Utilization Review Department  ATTENTION: Please call with any questions or concerns to [phone] and carefully listen to the prompts so that you are directed to the right person. All voicemails are confidential.   For Discharge needs, contact Care Management DC Support Team at [phone] opt. 2  Send all requests for admission clinical reviews, approved or denied determinations and any other requests to dedicated fax number below belonging to the campus where the patient is receiving treatment. List of dedicated fax numbers for the Facilities:  FACILITY NAME UR FAX NUMBER   ADMISSION DENIALS (Administrative/Medical Necessity) [phone]   DISCHARGE SUPPORT TEAM ([facility]) [phone]   PARENT CHILD HEALTH (Maternity/NICU/Pediatrics) [phone]   [facility] [phone]   [facility] [phone]   [facility] [phone]   [facility] [phone]   [facility] [phone]   [facility] [phone]   [facility]  [place] [phone]   [facility] [phone]   [facility] [phone]   Critical access hospital [phone]   [facility] [phone]   [facility] [phone]

## 2024-10-20 LAB — PLACENTA IN STORAGE: NORMAL

## 2024-10-23 NOTE — ANESTHESIA POSTPROCEDURE EVALUATION
Post-Op Assessment Note    CV Status:  Stable  Pain Score: 0    Pain management: adequate       Mental Status:  Alert and sleepy   Hydration Status:  Euvolemic   PONV Controlled:  Controlled   Airway Patency:  Patent     Post Op Vitals Reviewed: Yes    No anethesia notable event occurred.    Staff: CRNA           Last Filed PACU Vitals:  Vitals Value Taken Time   Temp     Pulse     BP     Resp     SpO2         Modified Ingrid:  No data recorded

## 2024-12-09 ENCOUNTER — OFFICE VISIT (OUTPATIENT)
Age: 34
End: 2024-12-09
Payer: COMMERCIAL

## 2024-12-09 VITALS — SYSTOLIC BLOOD PRESSURE: 118 MMHG | DIASTOLIC BLOOD PRESSURE: 76 MMHG

## 2024-12-09 PROCEDURE — 99205 OFFICE O/P NEW HI 60 MIN: CPT | Performed by: PEDIATRICS

## 2024-12-09 NOTE — PATIENT INSTRUCTIONS
Continue to breastfeed on demand, during the day.    Offer the breast at night if you choose.    The more routinely breast milk is removed from the breast, the more milk you will make. Your hormone production changes throughout the day so that it is normal for the breasts to be more full in the morning than in the evening.     Use expressed breast milk or formula to supplement as you feel is needed.     You may find it helpful to apply lanolin ointment after breastfeeding or pumping and cover with parchment or wax paper until your nipples no longer feel so tender.

## 2024-12-09 NOTE — PROGRESS NOTES
INITIAL BREAST FEEDING EVALUATION    Informant/Relationship: Rema and Delano/mom and dad    Discussion of General Lactation Issues: Oswaldo is currently primarily breastfeeding. He had been getting a lot of bottles, but the pump broke, so Rema put him back to the breast. Oswaldo has never opened well and slides off the breast quickly. It was his PGM who first noticed that Oswaldo appeared tongue tied. Rema says attachment was 7/10 but this has gotten better. She has more pain on the right, although overall her pain is less now. Her pain resolves as the feeding goes on.     Infant is 57 days old today.        History:  Fertility Problem:yes - secondary infertility, diagnosed with PCOS, had gastric bypass and then conceived; oldest child is 14 years (13 years older than second)  Breast changes: was nursing older child into the pregnancy and breasts became softer when older one stopped breastfeeding  : yes - no intervention  Full term:yes - 39.4   labor:no  First nursing/attempt < 1 hour after birth:yes - about an hour  Skin to skin following delivery:yes - immediately  Breast changes after delivery:yes - close to 1 week; was pumping and saw milk changes; used the pump from the beginning and saw colostrum day 3 when she started pumping  Rooming in (infant in room with mother with exception of procedures, eg. Circumcision: yes - never left  Blood sugar issues:no  NICU stay:no  Jaundice:no  Phototherapy:no  Supplement given: (list supplement and method used as well as reason(s):yes - formula via bottle, using paced bottle feeding; donor milk not offered    Past Medical History:   Diagnosis Date    Asthma     no symptoms now    Narcolepsy     PCOS (polycystic ovarian syndrome)     diagnosed 2019    Sleep apnea     Varicella     had vaccines         Current Outpatient Medications:     cetirizine (ZyrTEC) 10 mg tablet, Take 10 mg by mouth, Disp: , Rfl:     diphenhydrAMINE (Benadryl Allergy) 25  mg tablet, Take 25 mg by mouth every 6 (six) hours as needed for itching, Disp: , Rfl:     Prenatal MV-Min-Fe Fum-FA-DHA (PRENATAL 1 PO), Take by mouth 2 tablets daily.  Bariatric prenatal vitamins (Patient not taking: Reported on 12/9/2024), Disp: , Rfl:     No Known Allergies    Social History     Substance and Sexual Activity   Drug Use Never    Comment: denies self, FOB denies, denies family hx       Social History     Interval Breastfeeding History:    Frequency of breast feeding: about 3 hours during the day; formula over night via bottle  Does mother feel breastfeeding is effective: Yes  Does infant appear satisfied after nursing:Yes, most of the time  Stooling pattern normal: lYes  Urinating frequently:Yes  Using shield or shells: No    Alternative/Artificial Feedings:   Bottle: Yes, at night for formula, using lansinoh or Dr. Frost's, pacing initially  Cup: No  Syringe/Finger: No           Formula Type: generic [employer] Similac equivalent                     Amount: 4 oz per bottle            Breast Milk:                      Amount: 6 oz per bottle            Frequency two bottles overnight and when mom is away  Elimination Problems: No      Equipment:  Nipple Shield             Type: n/a             Size: n/a             Frequency of Use: n/a  Pump            Type: Lansinoh 3.0 Smart pump (from last pregnancy); generic hands free            Frequency of Use: there is no breast stimulation over night for a week; typically uses the pump when she knows he will need to get a bottle; with the hands free pump collects 3-4 oz up to as much as 6 oz   Shells            Type: n/a            Frequency of use: n/a    Equipment Problems: yes the hands free doesn't feel as effective    Mom:  Breast: Pendulous, moderately large, multiple full glands palpated through each breast  Nipple Assessment in General: Normal: elongated/eraser, no discoloration and no damage noted.  Mother's Awareness of Feeding Cues                  Recognizes: Yes                  Verbalizes: Yes  Support System: FOB, both grandmothers  History of Breastfeeding: eldest was formula fed;  second for 5-6 months when she returned to work  Changes/Stressors/Violence: pain with attachment; doesn't always seem satisfied at the breast but feels that she might not have enough  Concerns/Goals: Rema plans to continue to feed at the breast during the day and offer bottles at night; she plans to wean further to bottles and not pump when she returns to work.    Problems with Mom: painful attachment, production not meeting baby''s need    Physical Exam  Constitutional:       Appearance: Normal appearance. She is well-developed and normal weight.   HENT:      Head: Normocephalic and atraumatic.   Eyes:      Extraocular Movements: Extraocular movements intact.   Neck:      Thyroid: No thyromegaly.   Cardiovascular:      Rate and Rhythm: Normal rate and regular rhythm.      Pulses: Normal pulses.      Heart sounds: Normal heart sounds. No murmur heard.  Pulmonary:      Effort: Pulmonary effort is normal.      Breath sounds: Normal breath sounds.   Musculoskeletal:      Cervical back: Normal range of motion and neck supple.   Lymphadenopathy:      Cervical: No cervical adenopathy.      Upper Body:      Right upper body: No pectoral adenopathy.      Left upper body: No pectoral adenopathy.   Neurological:      General: No focal deficit present.      Mental Status: She is alert and oriented to person, place, and time.   Psychiatric:         Mood and Affect: Mood normal.         Behavior: Behavior normal.         Thought Content: Thought content normal.         Judgment: Judgment normal.   Vitals and nursing note reviewed.         Infant:  Behaviors: Alert  Color: Healthy  Birth weight: 3.155 kg  Current weight: 5.15 kg    Problems with infant: Tongue tie      General Appearance:  Alert, active, no distress                             Head:  Normocephalic, AFOF,  sutures opposed                             Eyes:  Conjunctiva clear, no drainage                              Ears:  Normally placed, no anomolies                             Nose:  Septum intact, no drainage or erythema                           Mouth:  No lesions; tongue extends to the lower lip, has only slight lateralization, and lifts only about 25%; there is poor cupping of the examiner's finger and little peristalsis with primarily a piston like back and forth movement; the seal is lost easily with traction placed on the mandible; the frenulum attaches to the tongue 0.5 mm posterior to the tip of the tongue and at the crest of the inferior alveolar ridge; passive lift of the tongue narrows the oral gape                    Neck:  Supple, symmetrical, trachea midline, no adenopathy; thyroid: no enlargement, symmetric, no tenderness/mass/nodules                 Respiratory:  No grunting, flaring, retractions, breath sounds clear and equal            Cardiovascular:  Regular rate and rhythm. No murmur. Adequate perfusion/capillary refill. Femoral pulse present                    Abdomen:   Soft, non-tender, no masses, bowel sounds present, no HSM             Genitourinary:  Normal male, testes descended, no discharge, swelling, or pain, anus patent                          Spine:   No abnormalities noted        Musculoskeletal:  Full range of motion          Skin/Hair/Nails:   Skin warm, dry, and intact, no rashes or abnormal dyspigmentation or lesions                Neurologic:   No abnormal movement, tone appropriate for gestational age    [place] Latch:  Efficiency:               Lips Flanged: Yes, after frenotomy              Depth of latch: Good, after frenotomy; even better after readjustment after frenotomy              Audible Swallow: Yes, sustained SSB after frenotomy              Visible Milk: Yes, after frenotomy              Wide Open/ Asymmetrical: Yes, after frenotomy              Suck Swallow  Cycle: Breathing: Unlabored, Coordinated: Yes  Nipple Assessment after latch: Normal: elongated/eraser, no discoloration and no damage noted.  Latch Problems: After the frenotomy, Rema easily assists Oswaldo to a wider, deeper, more asymmetrical, and more comfortable attachment where he quickly attains a sustained SSB and breastfeeds until content.    Position:  Infant's Ergonomics/Body               Body Alignment: Yes               Head Supported: Yes               Close to Mom's body/ Lifted/ Supported: Yes               Mom's Ergonomics/Body: Yes                           Supported: Yes                           Sitting Back: Yes                           Brings Baby to her breast: Yes  Positioning Problems: None        Education:  Reviewed Latch: Reviewed how to gently compress the breast as if offering a sandwich to facilitate a deeper latch.    Reviewed Positioning for Dyad: Reviewed how to bring baby to the breast so that his lower lip and chin touch the breast with his nose just above the nipple to encourage a wider, more asymmetric latch.   Reviewed Frequency/Supply & Demand: Recommended feeding on demand: when the baby gives hunger cues, when the breasts feel full, every 3 hours during the day and every 5 hours at night counting from the beginning of one feeding to the beginning of the next; whichever comes first.    Reviewed Alternative/Artificial Feedings: Paced bottle feeding  Reviewed Mom/Breast care: Apply ointment to the nipples after breastfeeding or pumping and over with parchment or wax paper until tenderness resolves        Plan:  Discussed history and physical exams with Rema. Support given for her commitment to providing breast milk for her baby. Discussed the findings on the baby's exam consistent with tongue tie and reviewed how this may be the cause of nipple trauma, nipple pain, nipple damage, poor milk transfer, blocked ducts, mastitis, and loss of milk production. Discussed the  science that supports performing the frenotomy to improve latch.    Gave support for any feeding plan that Rema chooses. Encouraged continued breastfeeding during the day. Encouraged offering the breast at night if she wishes. Reviewed the demand and supply nature of milk production. Encouraged some milk expression as Rema is comfortable to use for bottles. Recommended applying lanolin to the nipples after breastfeeding or pumping and covering with parchment or wax paper to facilitate healing.    I have spent 60 minutes with Patient and family today in which greater than 50% of this time was spent in counseling/coordination of care regarding Prognosis, Risks and benefits of tx options, Instructions for management, Patient and family education, Importance of tx compliance, Risk factor reductions, Impressions, Counseling / Coordination of care, Documenting in the medical record, Reviewing / ordering tests, medicine, procedures  , and Obtaining or reviewing history  .

## 2025-01-28 ENCOUNTER — OFFICE VISIT (OUTPATIENT)
Dept: OBGYN CLINIC | Facility: CLINIC | Age: 35
End: 2025-01-28
Payer: COMMERCIAL

## 2025-01-28 VITALS
DIASTOLIC BLOOD PRESSURE: 68 MMHG | WEIGHT: 189 LBS | BODY MASS INDEX: 30.37 KG/M2 | SYSTOLIC BLOOD PRESSURE: 122 MMHG | HEIGHT: 66 IN

## 2025-01-28 DIAGNOSIS — Z32.02 NEGATIVE PREGNANCY TEST: ICD-10-CM

## 2025-01-28 DIAGNOSIS — Z12.4 SCREENING FOR CERVICAL CANCER: ICD-10-CM

## 2025-01-28 DIAGNOSIS — Z30.016 ENCOUNTER FOR INITIAL PRESCRIPTION OF TRANSDERMAL PATCH HORMONAL CONTRACEPTIVE DEVICE: ICD-10-CM

## 2025-01-28 DIAGNOSIS — Z01.419 WELL WOMAN EXAM WITH ROUTINE GYNECOLOGICAL EXAM: Primary | ICD-10-CM

## 2025-01-28 PROBLEM — Z87.42 HISTORY OF PCOS: Status: RESOLVED | Noted: 2024-04-10 | Resolved: 2025-01-28

## 2025-01-28 PROBLEM — O47.9 UTERINE CONTRACTIONS: Status: RESOLVED | Noted: 2024-10-12 | Resolved: 2025-01-28

## 2025-01-28 PROBLEM — O99.210 MATERNAL OBESITY AFFECTING PREGNANCY, ANTEPARTUM: Status: RESOLVED | Noted: 2024-04-10 | Resolved: 2025-01-28

## 2025-01-28 PROBLEM — Z87.42 HISTORY OF PCOS: Status: ACTIVE | Noted: 2025-01-28

## 2025-01-28 PROBLEM — O28.3 NUCHAL FOLD THICKENING ON PRENATAL ULTRASOUND: Status: RESOLVED | Noted: 2024-06-07 | Resolved: 2025-01-28

## 2025-01-28 PROBLEM — O09.899 SHORT INTERVAL BETWEEN PREGNANCIES AFFECTING PREGNANCY, ANTEPARTUM: Status: RESOLVED | Noted: 2024-06-07 | Resolved: 2025-01-28

## 2025-01-28 PROBLEM — O09.299 HISTORY OF INTRAUTERINE GROWTH RESTRICTION IN PRIOR PREGNANCY, CURRENTLY PREGNANT: Status: RESOLVED | Noted: 2024-04-10 | Resolved: 2025-01-28

## 2025-01-28 LAB — SL AMB POCT URINE HCG: NEGATIVE

## 2025-01-28 PROCEDURE — 81025 URINE PREGNANCY TEST: CPT | Performed by: PHYSICIAN ASSISTANT

## 2025-01-28 PROCEDURE — G0476 HPV COMBO ASSAY CA SCREEN: HCPCS | Performed by: PHYSICIAN ASSISTANT

## 2025-01-28 PROCEDURE — G0143 SCR C/V CYTO,THINLAYER,RESCR: HCPCS | Performed by: PHYSICIAN ASSISTANT

## 2025-01-28 PROCEDURE — 99395 PREV VISIT EST AGE 18-39: CPT | Performed by: PHYSICIAN ASSISTANT

## 2025-01-28 RX ORDER — NORELGESTROMIN AND ETHINYL ESTRADIOL 35; 150 UG/MG; UG/MG
1 PATCH TRANSDERMAL WEEKLY
Qty: 9 PATCH | Refills: 1 | Status: SHIPPED | OUTPATIENT
Start: 2025-01-28

## 2025-01-28 NOTE — PROGRESS NOTES
Assessment   34 y.o.  presenting for annual exam.     Plan   Diagnoses and all orders for this visit:    Well woman exam with routine gynecological exam    Encounter for initial prescription of transdermal patch hormonal contraceptive device  -     norelgestromin-ethinyl estradiol (ORTHO EVRA) 150-35 MCG/24HR; Place 1 patch on the skin over 7 days once a week    Negative pregnancy test  -     POCT urine HCG    Screening for cervical cancer  -     Liquid-based pap, screening    Other orders  -     solriamfetol (SUNOSI) 75 mg tablet; Take 75 mg by mouth daily       Birth control - Prescription for transdermal patch sent to pharmacy. Patient was educated on proper usage of the patch. She was advised to replace the patch every week for 3 weeks, then have the 4th week be patch free to allow for a bleed. Counseled on possibility of decreased effectiveness with BMI >30 and patch use. She will try the patch and come back in 3 months for a follow up visit. She was interested in the Paragard IUD but will try the patch first due to possible side effects of the paragard. She is not interested in a hormonal IUD at this time but took home a pamphlet to read more about the different kinds. Discussed benefits of Mirena/Kyleena IUD vs Paragard IUD. Reviewed with a hx of PCOS and irregular menses Mirena/Kyleena IUD would provide more benefit in regulating menses and protecting endometrium compared to Paragard IUD.   Pap done today    Perineal hygiene reviewed. Weight bearing exercises minium of 150 mins/weekly advised. Kegel exercises recommended.   SBE encouraged, A yearly mammogram is recommended for breast cancer screening starting at age 40. ASCCP guidelines reviewed. Condoms encouraged with all sexual activity to prevent STI's. Gardisil vaccines recommended up to age 45. Calcium/ Vit D dietary requirements discussed.   Advised to call with any issues, all concerns & questions addressed.   See provided information in your  after visit summary     F/U Annually and PRN    Results will be released to [facility], if abnormal will call or message to review and discuss treatment plan.     __________________________________________________________________    Subjective     Rema Esposito is a 34 y.o.  presenting for annual exam. She is without complaint and does not want STD testing today.     Patient came in wanting to discuss birth control options and was initially interested in a non-hormonal IUD. She does not want to take birth control pills due to decrease absorption from her bariatric surgery. She did have a mirena IUD placed after her first child in , felt like after she had that removed it made her periods very irregular. Thinks she had it taken out in about  and did not have menstrual regularity again until . She believes it was from the IUD, but was advised that it was likely from her PCOS and weight at the time. She did well when it was in and did not have any significant side effects.     She has been amenorrheic since delivery; was breastfeeding from 10/13 to about . Says that her breast pump broke and while she was waiting for a new one, her supply decreased so she switched to formula. She has not been breastfeeding for the last 4 weeks. Says that just before she got pregnant with her second child her periods had just started to be regular. She would bleed for about 7 days and it came every 28-30 days. They were only regular for about 3-4 months. Says she was supposed to wait at least a year and a half after having her bariatric surgery to get pregnant but ended up only waiting about 6 months. She is no longer taking a prenatal vitamin since she stopped breast feeding.     SCREENING  Last Pap: 2022 neg/neg    GYN  Has been amenorrheic since delivery    Sexually active: Yes - single partner - male  Contraception: Not currently using any form of contraception - wants to discuss IUD  Hx STI:  "denies     Hx Abnormal pap: denies  We reviewed ASCCP guidelines for Pap testing today.  Gardasil: She has completed the Gardasil series.    Denies vaginal discharge, itching, odor, dyspareunia, pelvic pain and vulvar/vaginal symptoms      OB     Vaginal delivery in October.         Complaints: denies   Denies urgency, frequency, hematuria, leakage / change in stream, difficulty urinating.       BREAST  Complaints: denies any actual symptoms; feels like her breast tissue has diminished completely after breast feeding; says she feels like they are \"empty\". Is still having some milk discharge.   Denies: breast lump, breast tenderness, nipple discharge, skin color change, and skin lesion(s)      Pertinent Family Hx:   Family hx of breast cancer: no  Family hx of ovarian cancer: no  Family hx of colon cancer: no      GENERAL  PMH reviewed/updated and is as below.   Patient does follow with a PCP.    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      Past Medical History:   Diagnosis Date    Asthma     no symptoms now    Narcolepsy     PCOS (polycystic ovarian syndrome)     diagnosed 2019    Sleep apnea     Varicella     had vaccines       Past Surgical History:   Procedure Laterality Date    GASTRIC BYPASS      23         Current Outpatient Medications:     cetirizine (ZyrTEC) 10 mg tablet, Take 10 mg by mouth, Disp: , Rfl:     diphenhydrAMINE (Benadryl Allergy) 25 mg tablet, Take 25 mg by mouth every 6 (six) hours as needed for itching, Disp: , Rfl:     norelgestromin-ethinyl estradiol (ORTHO EVRA) 150-35 MCG/24HR, Place 1 patch on the skin over 7 days once a week, Disp: 9 patch, Rfl: 1    solriamfetol (SUNOSI) 75 mg tablet, Take 75 mg by mouth daily, Disp: , Rfl:     Prenatal MV-Min-Fe Fum-FA-DHA (PRENATAL 1 PO), Take by mouth 2 tablets daily.  Bariatric prenatal vitamins (Patient not taking: Reported on 2025), Disp: , Rfl: "     No Known Allergies    Social History     Socioeconomic History    Marital status: Single     Spouse name: Not on file    Number of children: Not on file    Years of education: Not on file    Highest education level: Not on file   Occupational History    Not on file   Tobacco Use    Smoking status: Never    Smokeless tobacco: Never   Vaping Use    Vaping status: Never Used   Substance and Sexual Activity    Alcohol use: Never     Comment: none with pregnancy    Drug use: Never     Comment: denies self, FOB denies, denies family hx    Sexual activity: Yes     Partners: Male     Birth control/protection: None     Comment: CHICHI Regan   Other Topics Concern    Not on file   Social History Narrative    Not on file     Social Drivers of Health     Financial Resource Strain: Not on file   Food Insecurity: No Food Insecurity (10/13/2024)    Nursing - Inadequate Food Risk Classification     Worried About Running Out of Food in the Last Year: Never true     Ran Out of Food in the Last Year: Never true     Ran Out of Food in the Last Year: Not on file   Transportation Needs: No Transportation Needs (10/13/2024)    PRAPARE - Transportation     Lack of Transportation (Medical): No     Lack of Transportation (Non-Medical): No   Physical Activity: Not on file   Stress: Not on file   Social Connections: Not on file   Intimate Partner Violence: Not on file   Housing Stability: Low Risk  (10/13/2024)    Housing Stability Vital Sign     Unable to Pay for Housing in the Last Year: No     Number of Times Moved in the Last Year: 0     Homeless in the Last Year: No       Review of Systems     ROS:  Constitutional: Negative for fatigue and unexpected weight change.   Respiratory: Negative for cough and shortness of breath.    Cardiovascular: Negative for chest pain and palpitations.   Gastrointestinal: Negative for abdominal pain and change in bowel habits  Breasts:  Negative, other than as noted above.   Genitourinary: Negative,  "other than as noted above.   Psychiatric: Negative for mood difficulties.      Objective      /68 (BP Location: Left arm, Patient Position: Sitting, Cuff Size: Standard)   Ht 5' 6\" (1.676 m)   Wt 85.7 kg (189 lb)   Breastfeeding No   BMI 30.51 kg/m²     Physical Examination:    Patient appears well and is not in distress  Neck is supple without masses, no cervical or supraclavicular lymphadenopathy  Cardiovascular: regular rate and rhythm; no murmurs  Lungs: clear to auscultation bilaterally; no wheezes  Breasts are symmetrical without mass, tenderness, nipple discharge, skin changes or adenopathy.   Abdomen is soft and nontender without masses.   External genitals are normal without lesions or rashes.  Urethral meatus and urethra are normal  Bladder is normal to palpation  Vagina is normal without discharge or bleeding.   Cervix is normal without discharge or lesion.   Uterus is normal, mobile, nontender without palpable mass.  Adnexa are normal, nontender, without palpable mass.     History and physical exam performed by SUKHWINDER Thapa with direct supervision by me            "

## 2025-01-29 LAB
HPV HR 12 DNA CVX QL NAA+PROBE: NEGATIVE
HPV16 DNA CVX QL NAA+PROBE: NEGATIVE
HPV18 DNA CVX QL NAA+PROBE: NEGATIVE

## 2025-01-31 ENCOUNTER — RESULTS FOLLOW-UP (OUTPATIENT)
Dept: OBGYN CLINIC | Facility: CLINIC | Age: 35
End: 2025-01-31

## 2025-01-31 LAB
LAB AP GYN PRIMARY INTERPRETATION: NORMAL
Lab: NORMAL